# Patient Record
Sex: FEMALE | Race: WHITE | NOT HISPANIC OR LATINO | Employment: OTHER | URBAN - METROPOLITAN AREA
[De-identification: names, ages, dates, MRNs, and addresses within clinical notes are randomized per-mention and may not be internally consistent; named-entity substitution may affect disease eponyms.]

---

## 2017-05-09 PROCEDURE — 87088 URINE BACTERIA CULTURE: CPT | Performed by: INTERNAL MEDICINE

## 2017-05-09 PROCEDURE — 87186 SC STD MICRODIL/AGAR DIL: CPT | Performed by: INTERNAL MEDICINE

## 2017-05-09 PROCEDURE — 87086 URINE CULTURE/COLONY COUNT: CPT | Performed by: INTERNAL MEDICINE

## 2017-07-31 PROBLEM — E78.2 MIXED HYPERLIPIDEMIA: Status: ACTIVE | Noted: 2017-07-31

## 2018-01-03 PROCEDURE — 81001 URINALYSIS AUTO W/SCOPE: CPT | Performed by: INTERNAL MEDICINE

## 2018-01-05 PROBLEM — M85.80 OSTEOPENIA, UNSPECIFIED LOCATION: Status: ACTIVE | Noted: 2018-01-05

## 2018-01-05 PROCEDURE — 87086 URINE CULTURE/COLONY COUNT: CPT | Performed by: INTERNAL MEDICINE

## 2018-01-05 PROCEDURE — 87147 CULTURE TYPE IMMUNOLOGIC: CPT | Performed by: INTERNAL MEDICINE

## 2019-01-09 PROCEDURE — 81001 URINALYSIS AUTO W/SCOPE: CPT | Performed by: INTERNAL MEDICINE

## 2019-01-11 PROCEDURE — 87186 SC STD MICRODIL/AGAR DIL: CPT | Performed by: INTERNAL MEDICINE

## 2019-01-11 PROCEDURE — 87088 URINE BACTERIA CULTURE: CPT | Performed by: INTERNAL MEDICINE

## 2019-01-11 PROCEDURE — 87086 URINE CULTURE/COLONY COUNT: CPT | Performed by: INTERNAL MEDICINE

## 2019-01-29 PROCEDURE — 87086 URINE CULTURE/COLONY COUNT: CPT | Performed by: INTERNAL MEDICINE

## 2021-01-12 PROBLEM — M16.11 ARTHRITIS OF RIGHT HIP: Status: ACTIVE | Noted: 2021-01-12

## 2021-02-24 ENCOUNTER — WALK IN (OUTPATIENT)
Dept: URGENT CARE | Age: 81
End: 2021-02-24
Attending: EMERGENCY MEDICINE

## 2021-02-24 ENCOUNTER — HOSPITAL ENCOUNTER (OUTPATIENT)
Dept: CT IMAGING | Age: 81
Discharge: HOME | End: 2021-02-24
Attending: EMERGENCY MEDICINE

## 2021-02-24 VITALS
DIASTOLIC BLOOD PRESSURE: 100 MMHG | SYSTOLIC BLOOD PRESSURE: 152 MMHG | HEART RATE: 78 BPM | OXYGEN SATURATION: 98 % | RESPIRATION RATE: 16 BRPM | TEMPERATURE: 97.8 F

## 2021-02-24 DIAGNOSIS — S01.81XA LACERATION OF FOREHEAD, INITIAL ENCOUNTER: ICD-10-CM

## 2021-02-24 DIAGNOSIS — S09.8XXA BLUNT HEAD TRAUMA, INITIAL ENCOUNTER: ICD-10-CM

## 2021-02-24 DIAGNOSIS — S09.8XXA BLUNT HEAD TRAUMA, INITIAL ENCOUNTER: Primary | ICD-10-CM

## 2021-02-24 PROCEDURE — 12013 RPR F/E/E/N/L/M 2.6-5.0 CM: CPT

## 2021-02-24 PROCEDURE — 99202 OFFICE O/P NEW SF 15 MIN: CPT

## 2021-02-24 PROCEDURE — 70450 CT HEAD/BRAIN W/O DYE: CPT

## 2021-02-24 ASSESSMENT — ENCOUNTER SYMPTOMS
COUGH: 0
EYE PAIN: 0
ABDOMINAL PAIN: 0
BACK PAIN: 0
FEVER: 0
DIZZINESS: 0

## 2021-03-03 ENCOUNTER — WALK IN (OUTPATIENT)
Dept: URGENT CARE | Age: 81
End: 2021-03-03
Attending: EMERGENCY MEDICINE

## 2021-03-03 VITALS
HEART RATE: 74 BPM | RESPIRATION RATE: 18 BRPM | SYSTOLIC BLOOD PRESSURE: 133 MMHG | TEMPERATURE: 96.4 F | OXYGEN SATURATION: 98 % | DIASTOLIC BLOOD PRESSURE: 90 MMHG | WEIGHT: 127 LBS

## 2021-03-03 DIAGNOSIS — Z48.02 VISIT FOR SUTURE REMOVAL: Primary | ICD-10-CM

## 2021-03-03 PROCEDURE — 99211 OFF/OP EST MAY X REQ PHY/QHP: CPT

## 2021-03-03 ASSESSMENT — ENCOUNTER SYMPTOMS
NAUSEA: 0
EYE REDNESS: 0
ACTIVITY CHANGE: 0
FACIAL SWELLING: 0
DIZZINESS: 0
DIARRHEA: 0
POLYPHAGIA: 0
ABDOMINAL PAIN: 0
BACK PAIN: 0
EYE PAIN: 0
VOMITING: 0
BRUISES/BLEEDS EASILY: 0
CHILLS: 0
SORE THROAT: 0
FEVER: 0
SHORTNESS OF BREATH: 0
WHEEZING: 0
HEADACHES: 0
WOUND: 0
CONFUSION: 0
POLYDIPSIA: 0
EYE DISCHARGE: 0
COLOR CHANGE: 0
COUGH: 0
NUMBNESS: 0

## 2021-04-20 NOTE — H&P (VIEW-ONLY)
[facility]: Adult Reconstruction Clinic Note    CC: Right HIP PAIN     HPI: Ravi Morrell female presents for presurgical evaluation.  She is accompanied by her daughter. Adama Patricia above 15.      States that her pain has been in mouth 2 (two) times daily. , Disp: 180 capsule, Rfl: 3  DAILY MULTIVITAMIN OR, 1 TAB A DAY, Disp: , Rfl:     No current facility-administered medications on file prior to visit.     Past Surgical History:   Procedure Laterality Date   • Appendectomy     • Co Determinants of Health  Financial Resource Strain:       Difficulty of Paying Living Expenses:   Food Insecurity:       Worried About [address] in the Last Year:       Ran Out of Food in the Last Year:   Transportation Needs:       Lack of Transp Keven, negative tender to palpation over greater trochanter     IMAGING: None new     IMPRESSION:   79year old female presents with right hip pain secondary to severe degenerative joint disease.     PLAN:   Reiterated treatment options.      She rem

## 2021-04-23 RX ORDER — MULTIVIT-MIN/IRON/FOLIC ACID/K 18-600-40
1 CAPSULE ORAL DAILY
COMMUNITY

## 2021-04-25 ENCOUNTER — LAB ENCOUNTER (OUTPATIENT)
Dept: LAB | Facility: HOSPITAL | Age: 81
End: 2021-04-25
Attending: ORTHOPAEDIC SURGERY
Payer: MEDICARE

## 2021-04-25 DIAGNOSIS — Z01.818 PREOPERATIVE TESTING: ICD-10-CM

## 2021-04-25 PROCEDURE — 86850 RBC ANTIBODY SCREEN: CPT

## 2021-04-25 PROCEDURE — 36415 COLL VENOUS BLD VENIPUNCTURE: CPT

## 2021-04-25 PROCEDURE — 86901 BLOOD TYPING SEROLOGIC RH(D): CPT

## 2021-04-25 PROCEDURE — 86900 BLOOD TYPING SEROLOGIC ABO: CPT

## 2021-04-27 RX ORDER — TRANEXAMIC ACID 10 MG/ML
1000 INJECTION, SOLUTION INTRAVENOUS ONCE
Status: COMPLETED | OUTPATIENT
Start: 2021-04-28 | End: 2021-04-28

## 2021-04-28 ENCOUNTER — HOSPITAL ENCOUNTER (INPATIENT)
Facility: HOSPITAL | Age: 81
LOS: 1 days | Discharge: HOME HEALTH | DRG: 470 | End: 2021-05-01
Attending: ORTHOPAEDIC SURGERY | Admitting: ORTHOPAEDIC SURGERY
Payer: MEDICARE

## 2021-04-28 ENCOUNTER — ANESTHESIA EVENT (OUTPATIENT)
Dept: SURGERY | Facility: HOSPITAL | Age: 81
DRG: 470 | End: 2021-04-28
Payer: MEDICARE

## 2021-04-28 ENCOUNTER — APPOINTMENT (OUTPATIENT)
Dept: GENERAL RADIOLOGY | Facility: HOSPITAL | Age: 81
DRG: 470 | End: 2021-04-28
Attending: PHYSICIAN ASSISTANT
Payer: MEDICARE

## 2021-04-28 ENCOUNTER — ANESTHESIA (OUTPATIENT)
Dept: SURGERY | Facility: HOSPITAL | Age: 81
DRG: 470 | End: 2021-04-28
Payer: MEDICARE

## 2021-04-28 ENCOUNTER — APPOINTMENT (OUTPATIENT)
Dept: GENERAL RADIOLOGY | Facility: HOSPITAL | Age: 81
DRG: 470 | End: 2021-04-28
Attending: ORTHOPAEDIC SURGERY
Payer: MEDICARE

## 2021-04-28 DIAGNOSIS — Z01.818 PREOPERATIVE TESTING: Primary | ICD-10-CM

## 2021-04-28 DIAGNOSIS — M16.11 PRIMARY OSTEOARTHRITIS OF RIGHT HIP: ICD-10-CM

## 2021-04-28 DIAGNOSIS — K58.9 IRRITABLE BOWEL SYNDROME WITHOUT DIARRHEA: ICD-10-CM

## 2021-04-28 PROCEDURE — 76000 FLUOROSCOPY <1 HR PHYS/QHP: CPT | Performed by: ORTHOPAEDIC SURGERY

## 2021-04-28 PROCEDURE — 0SR904A REPLACEMENT OF RIGHT HIP JOINT WITH CERAMIC ON POLYETHYLENE SYNTHETIC SUBSTITUTE, UNCEMENTED, OPEN APPROACH: ICD-10-PCS | Performed by: ORTHOPAEDIC SURGERY

## 2021-04-28 PROCEDURE — 86701 HIV-1ANTIBODY: CPT | Performed by: ANESTHESIOLOGY

## 2021-04-28 PROCEDURE — 80048 BASIC METABOLIC PNL TOTAL CA: CPT | Performed by: NURSE PRACTITIONER

## 2021-04-28 PROCEDURE — 86803 HEPATITIS C AB TEST: CPT | Performed by: ANESTHESIOLOGY

## 2021-04-28 PROCEDURE — 73501 X-RAY EXAM HIP UNI 1 VIEW: CPT | Performed by: PHYSICIAN ASSISTANT

## 2021-04-28 PROCEDURE — 87340 HEPATITIS B SURFACE AG IA: CPT | Performed by: ANESTHESIOLOGY

## 2021-04-28 PROCEDURE — 88311 DECALCIFY TISSUE: CPT | Performed by: ORTHOPAEDIC SURGERY

## 2021-04-28 PROCEDURE — 88305 TISSUE EXAM BY PATHOLOGIST: CPT | Performed by: ORTHOPAEDIC SURGERY

## 2021-04-28 DEVICE — TOTAL HIP W/GRP CUP & COCR HD: Type: IMPLANTABLE DEVICE | Status: FUNCTIONAL

## 2021-04-28 DEVICE — SUMMIT FEMORAL STEM 12/14 TAPER TAPER ED W/POROCOAT SIZE 4 STD 140MM
Type: IMPLANTABLE DEVICE | Site: HIP | Status: FUNCTIONAL
Brand: SUMMIT POROCOAT

## 2021-04-28 DEVICE — BIOLOX DELTA CERAMIC FEMORAL HEAD +1.5 36MM DIA 12/14 TAPER
Type: IMPLANTABLE DEVICE | Site: HIP | Status: FUNCTIONAL
Brand: BIOLOX DELTA

## 2021-04-28 DEVICE — PINNACLE HIP SOLUTIONS ALTRX POLYETHYLENE ACETABULAR LINER NEUTRAL 36MM ID 54MM OD
Type: IMPLANTABLE DEVICE | Site: HIP | Status: FUNCTIONAL
Brand: PINNACLE ALTRX

## 2021-04-28 DEVICE — PINNACLE GRIPTION ACETABULAR SHELL MULTI-HOLE 54MM OD
Type: IMPLANTABLE DEVICE | Site: HIP | Status: FUNCTIONAL
Brand: PINNACLE GRIPTION

## 2021-04-28 DEVICE — CERAMIC HEAD UPCHARGE: Type: IMPLANTABLE DEVICE | Status: FUNCTIONAL

## 2021-04-28 RX ORDER — HYDROMORPHONE HYDROCHLORIDE 1 MG/ML
0.4 INJECTION, SOLUTION INTRAMUSCULAR; INTRAVENOUS; SUBCUTANEOUS EVERY 5 MIN PRN
Status: DISCONTINUED | OUTPATIENT
Start: 2021-04-28 | End: 2021-04-28 | Stop reason: HOSPADM

## 2021-04-28 RX ORDER — MORPHINE SULFATE 4 MG/ML
2 INJECTION, SOLUTION INTRAMUSCULAR; INTRAVENOUS EVERY 10 MIN PRN
Status: DISCONTINUED | OUTPATIENT
Start: 2021-04-28 | End: 2021-04-28 | Stop reason: HOSPADM

## 2021-04-28 RX ORDER — HYDROMORPHONE HYDROCHLORIDE 1 MG/ML
0.2 INJECTION, SOLUTION INTRAMUSCULAR; INTRAVENOUS; SUBCUTANEOUS EVERY 5 MIN PRN
Status: DISCONTINUED | OUTPATIENT
Start: 2021-04-28 | End: 2021-04-28 | Stop reason: HOSPADM

## 2021-04-28 RX ORDER — ACETAMINOPHEN 500 MG
1000 TABLET ORAL ONCE
Status: DISCONTINUED | OUTPATIENT
Start: 2021-04-28 | End: 2021-04-28

## 2021-04-28 RX ORDER — SENNOSIDES 8.6 MG
17.2 TABLET ORAL NIGHTLY
Status: DISCONTINUED | OUTPATIENT
Start: 2021-04-28 | End: 2021-05-01

## 2021-04-28 RX ORDER — SODIUM PHOSPHATE, DIBASIC AND SODIUM PHOSPHATE, MONOBASIC 7; 19 G/133ML; G/133ML
1 ENEMA RECTAL ONCE AS NEEDED
Status: DISCONTINUED | OUTPATIENT
Start: 2021-04-28 | End: 2021-05-01

## 2021-04-28 RX ORDER — SODIUM CHLORIDE, SODIUM LACTATE, POTASSIUM CHLORIDE, CALCIUM CHLORIDE 600; 310; 30; 20 MG/100ML; MG/100ML; MG/100ML; MG/100ML
INJECTION, SOLUTION INTRAVENOUS CONTINUOUS
Status: DISCONTINUED | OUTPATIENT
Start: 2021-04-28 | End: 2021-04-28 | Stop reason: HOSPADM

## 2021-04-28 RX ORDER — LIDOCAINE HYDROCHLORIDE 10 MG/ML
INJECTION, SOLUTION INFILTRATION; PERINEURAL
Status: COMPLETED | OUTPATIENT
Start: 2021-04-28 | End: 2021-04-28

## 2021-04-28 RX ORDER — DIPHENHYDRAMINE HYDROCHLORIDE 50 MG/ML
12.5 INJECTION INTRAMUSCULAR; INTRAVENOUS EVERY 4 HOURS PRN
Status: DISCONTINUED | OUTPATIENT
Start: 2021-04-28 | End: 2021-05-01

## 2021-04-28 RX ORDER — PANTOPRAZOLE SODIUM 20 MG/1
20 TABLET, DELAYED RELEASE ORAL
Status: DISCONTINUED | OUTPATIENT
Start: 2021-04-29 | End: 2021-05-01

## 2021-04-28 RX ORDER — DIPHENHYDRAMINE HYDROCHLORIDE 50 MG/ML
25 INJECTION INTRAMUSCULAR; INTRAVENOUS ONCE AS NEEDED
Status: ACTIVE | OUTPATIENT
Start: 2021-04-28 | End: 2021-04-28

## 2021-04-28 RX ORDER — CELECOXIB 200 MG/1
200 CAPSULE ORAL ONCE
Status: COMPLETED | OUTPATIENT
Start: 2021-04-28 | End: 2021-04-28

## 2021-04-28 RX ORDER — MORPHINE SULFATE 10 MG/ML
6 INJECTION, SOLUTION INTRAMUSCULAR; INTRAVENOUS EVERY 10 MIN PRN
Status: DISCONTINUED | OUTPATIENT
Start: 2021-04-28 | End: 2021-04-28 | Stop reason: HOSPADM

## 2021-04-28 RX ORDER — MORPHINE SULFATE 4 MG/ML
4 INJECTION, SOLUTION INTRAMUSCULAR; INTRAVENOUS EVERY 2 HOUR PRN
Status: DISCONTINUED | OUTPATIENT
Start: 2021-04-28 | End: 2021-05-01

## 2021-04-28 RX ORDER — ASPIRIN 81 MG/1
81 TABLET ORAL 2 TIMES DAILY
Qty: 60 TABLET | Refills: 0 | Status: SHIPPED | OUTPATIENT
Start: 2021-04-28 | End: 2022-01-24

## 2021-04-28 RX ORDER — BISACODYL 10 MG
10 SUPPOSITORY, RECTAL RECTAL
Status: DISCONTINUED | OUTPATIENT
Start: 2021-04-28 | End: 2021-05-01

## 2021-04-28 RX ORDER — PREGABALIN 75 MG/1
75 CAPSULE ORAL EVERY MORNING
Qty: 30 CAPSULE | Refills: 0 | Status: SHIPPED | OUTPATIENT
Start: 2021-04-28 | End: 2022-01-24

## 2021-04-28 RX ORDER — SODIUM CHLORIDE, SODIUM LACTATE, POTASSIUM CHLORIDE, CALCIUM CHLORIDE 600; 310; 30; 20 MG/100ML; MG/100ML; MG/100ML; MG/100ML
INJECTION, SOLUTION INTRAVENOUS CONTINUOUS
Status: DISCONTINUED | OUTPATIENT
Start: 2021-04-28 | End: 2021-04-30

## 2021-04-28 RX ORDER — OXYCODONE HYDROCHLORIDE 5 MG/1
CAPSULE ORAL
Qty: 20 CAPSULE | Refills: 0 | Status: SHIPPED | OUTPATIENT
Start: 2021-04-28 | End: 2021-08-17 | Stop reason: ALTCHOICE

## 2021-04-28 RX ORDER — ACETAMINOPHEN 500 MG
1000 TABLET ORAL ONCE
Status: COMPLETED | OUTPATIENT
Start: 2021-04-28 | End: 2021-04-28

## 2021-04-28 RX ORDER — ACETAMINOPHEN 500 MG
1000 TABLET ORAL EVERY 6 HOURS PRN
Qty: 90 TABLET | Refills: 1 | Status: SHIPPED | OUTPATIENT
Start: 2021-04-28

## 2021-04-28 RX ORDER — CHLORDIAZEPOXIDE HYDROCHLORIDE 5 MG/1
5 CAPSULE, GELATIN COATED ORAL NIGHTLY
Status: DISCONTINUED | OUTPATIENT
Start: 2021-04-28 | End: 2021-05-01

## 2021-04-28 RX ORDER — OXYCODONE HYDROCHLORIDE 5 MG/1
5 TABLET ORAL EVERY 4 HOURS PRN
Status: DISCONTINUED | OUTPATIENT
Start: 2021-04-28 | End: 2021-05-01

## 2021-04-28 RX ORDER — OXYCODONE HCL 10 MG/1
10 TABLET, FILM COATED, EXTENDED RELEASE ORAL ONCE
Status: COMPLETED | OUTPATIENT
Start: 2021-04-28 | End: 2021-04-28

## 2021-04-28 RX ORDER — ONDANSETRON 2 MG/ML
4 INJECTION INTRAMUSCULAR; INTRAVENOUS EVERY 4 HOURS PRN
Status: DISPENSED | OUTPATIENT
Start: 2021-04-28 | End: 2021-04-30

## 2021-04-28 RX ORDER — ONDANSETRON 2 MG/ML
4 INJECTION INTRAMUSCULAR; INTRAVENOUS ONCE AS NEEDED
Status: COMPLETED | OUTPATIENT
Start: 2021-04-28 | End: 2021-04-28

## 2021-04-28 RX ORDER — DIPHENHYDRAMINE HCL 25 MG
25 CAPSULE ORAL EVERY 4 HOURS PRN
Status: DISCONTINUED | OUTPATIENT
Start: 2021-04-28 | End: 2021-05-01

## 2021-04-28 RX ORDER — ACETAMINOPHEN 500 MG
1000 TABLET ORAL EVERY 6 HOURS PRN
Status: DISCONTINUED | OUTPATIENT
Start: 2021-04-28 | End: 2021-05-01

## 2021-04-28 RX ORDER — MORPHINE SULFATE 4 MG/ML
4 INJECTION, SOLUTION INTRAMUSCULAR; INTRAVENOUS EVERY 10 MIN PRN
Status: DISCONTINUED | OUTPATIENT
Start: 2021-04-28 | End: 2021-04-28 | Stop reason: HOSPADM

## 2021-04-28 RX ORDER — CEFAZOLIN SODIUM/WATER 2 G/20 ML
2 SYRINGE (ML) INTRAVENOUS ONCE
Status: COMPLETED | OUTPATIENT
Start: 2021-04-28 | End: 2021-04-28

## 2021-04-28 RX ORDER — PROCHLORPERAZINE EDISYLATE 5 MG/ML
5 INJECTION INTRAMUSCULAR; INTRAVENOUS ONCE AS NEEDED
Status: DISCONTINUED | OUTPATIENT
Start: 2021-04-28 | End: 2021-04-28 | Stop reason: HOSPADM

## 2021-04-28 RX ORDER — HYDROCODONE BITARTRATE AND ACETAMINOPHEN 5; 325 MG/1; MG/1
1 TABLET ORAL AS NEEDED
Status: DISCONTINUED | OUTPATIENT
Start: 2021-04-28 | End: 2021-04-28 | Stop reason: HOSPADM

## 2021-04-28 RX ORDER — MORPHINE SULFATE 2 MG/ML
2 INJECTION, SOLUTION INTRAMUSCULAR; INTRAVENOUS EVERY 2 HOUR PRN
Status: DISCONTINUED | OUTPATIENT
Start: 2021-04-28 | End: 2021-05-01

## 2021-04-28 RX ORDER — NALOXONE HYDROCHLORIDE 0.4 MG/ML
80 INJECTION, SOLUTION INTRAMUSCULAR; INTRAVENOUS; SUBCUTANEOUS AS NEEDED
Status: DISCONTINUED | OUTPATIENT
Start: 2021-04-28 | End: 2021-04-28 | Stop reason: HOSPADM

## 2021-04-28 RX ORDER — HYDROMORPHONE HYDROCHLORIDE 1 MG/ML
0.6 INJECTION, SOLUTION INTRAMUSCULAR; INTRAVENOUS; SUBCUTANEOUS EVERY 5 MIN PRN
Status: DISCONTINUED | OUTPATIENT
Start: 2021-04-28 | End: 2021-04-28 | Stop reason: HOSPADM

## 2021-04-28 RX ORDER — POLYETHYLENE GLYCOL 3350 17 G/17G
17 POWDER, FOR SOLUTION ORAL DAILY PRN
Status: DISCONTINUED | OUTPATIENT
Start: 2021-04-28 | End: 2021-05-01

## 2021-04-28 RX ORDER — HYDROCODONE BITARTRATE AND ACETAMINOPHEN 5; 325 MG/1; MG/1
2 TABLET ORAL AS NEEDED
Status: DISCONTINUED | OUTPATIENT
Start: 2021-04-28 | End: 2021-04-28 | Stop reason: HOSPADM

## 2021-04-28 RX ORDER — LIDOCAINE HYDROCHLORIDE 10 MG/ML
INJECTION, SOLUTION EPIDURAL; INFILTRATION; INTRACAUDAL; PERINEURAL AS NEEDED
Status: DISCONTINUED | OUTPATIENT
Start: 2021-04-28 | End: 2021-04-28 | Stop reason: SURG

## 2021-04-28 RX ORDER — ASPIRIN 81 MG/1
81 TABLET ORAL 2 TIMES DAILY
Status: DISCONTINUED | OUTPATIENT
Start: 2021-04-28 | End: 2021-05-01

## 2021-04-28 RX ORDER — CELECOXIB 200 MG/1
200 CAPSULE ORAL EVERY MORNING
Qty: 30 CAPSULE | Refills: 0 | Status: SHIPPED | OUTPATIENT
Start: 2021-04-28 | End: 2022-01-24

## 2021-04-28 RX ORDER — PROCHLORPERAZINE EDISYLATE 5 MG/ML
10 INJECTION INTRAMUSCULAR; INTRAVENOUS EVERY 6 HOURS PRN
Status: ACTIVE | OUTPATIENT
Start: 2021-04-28 | End: 2021-04-30

## 2021-04-28 RX ORDER — DOCUSATE SODIUM 100 MG/1
100 CAPSULE, LIQUID FILLED ORAL 2 TIMES DAILY
Status: DISCONTINUED | OUTPATIENT
Start: 2021-04-28 | End: 2021-05-01

## 2021-04-28 RX ORDER — CEFAZOLIN SODIUM/WATER 2 G/20 ML
2 SYRINGE (ML) INTRAVENOUS EVERY 8 HOURS
Status: COMPLETED | OUTPATIENT
Start: 2021-04-28 | End: 2021-04-29

## 2021-04-28 RX ORDER — CELECOXIB 200 MG/1
200 CAPSULE ORAL DAILY
Status: DISCONTINUED | OUTPATIENT
Start: 2021-04-28 | End: 2021-05-01

## 2021-04-28 RX ORDER — BUPIVACAINE HYDROCHLORIDE 7.5 MG/ML
INJECTION, SOLUTION INTRASPINAL
Status: COMPLETED | OUTPATIENT
Start: 2021-04-28 | End: 2021-04-28

## 2021-04-28 RX ORDER — TRANEXAMIC ACID 10 MG/ML
1000 INJECTION, SOLUTION INTRAVENOUS ONCE
Status: COMPLETED | OUTPATIENT
Start: 2021-04-28 | End: 2021-04-28

## 2021-04-28 RX ORDER — MORPHINE SULFATE 2 MG/ML
1 INJECTION, SOLUTION INTRAMUSCULAR; INTRAVENOUS EVERY 2 HOUR PRN
Status: DISCONTINUED | OUTPATIENT
Start: 2021-04-28 | End: 2021-05-01

## 2021-04-28 RX ORDER — EPHEDRINE SULFATE 50 MG/ML
INJECTION INTRAVENOUS AS NEEDED
Status: DISCONTINUED | OUTPATIENT
Start: 2021-04-28 | End: 2021-04-28 | Stop reason: SURG

## 2021-04-28 RX ADMIN — LIDOCAINE HYDROCHLORIDE 5 ML: 10 INJECTION, SOLUTION INFILTRATION; PERINEURAL at 12:11:00

## 2021-04-28 RX ADMIN — SODIUM CHLORIDE, SODIUM LACTATE, POTASSIUM CHLORIDE, CALCIUM CHLORIDE: 600; 310; 30; 20 INJECTION, SOLUTION INTRAVENOUS at 12:03:00

## 2021-04-28 RX ADMIN — SODIUM CHLORIDE, SODIUM LACTATE, POTASSIUM CHLORIDE, CALCIUM CHLORIDE: 600; 310; 30; 20 INJECTION, SOLUTION INTRAVENOUS at 14:07:00

## 2021-04-28 RX ADMIN — TRANEXAMIC ACID 1000 MG: 10 INJECTION, SOLUTION INTRAVENOUS at 13:37:00

## 2021-04-28 RX ADMIN — TRANEXAMIC ACID 1000 MG: 10 INJECTION, SOLUTION INTRAVENOUS at 12:23:00

## 2021-04-28 RX ADMIN — EPHEDRINE SULFATE 10 MG: 50 INJECTION INTRAVENOUS at 12:25:00

## 2021-04-28 RX ADMIN — BUPIVACAINE HYDROCHLORIDE 1.5 ML: 7.5 INJECTION, SOLUTION INTRASPINAL at 12:11:00

## 2021-04-28 RX ADMIN — EPHEDRINE SULFATE 5 MG: 50 INJECTION INTRAVENOUS at 13:25:00

## 2021-04-28 RX ADMIN — LIDOCAINE HYDROCHLORIDE 30 MG: 10 INJECTION, SOLUTION EPIDURAL; INFILTRATION; INTRACAUDAL; PERINEURAL at 12:13:00

## 2021-04-28 RX ADMIN — CEFAZOLIN SODIUM/WATER 2 G: 2 G/20 ML SYRINGE (ML) INTRAVENOUS at 12:20:00

## 2021-04-28 NOTE — OPERATIVE REPORT
Operative Report     PATIENT NAME: Arlene Sheth  : 3/7/1940  KARSTEN: [id number]     DATE OF SURGERY: 2021     ATTENDING: Edie Woods MD     PREOPERATIVE DIAGNOSIS: Right hip osteoarthritis      POSTOPERATIVE DIAGNOSIS: Same     PROCEDURE PERF Branches of the lateral femoral circumflex artery were identified and thoroughly coagulated. The lateral border of the rectus femoris muscle was identified, elevated off of the capsule.  Cobra retractors were placed around the femoral neck, outside of the c followed by the rasp and chili pepper broach was used. A size 3 broach was slightly undersized.  We deemed a size 4 broach determined to be the appropriate size.     We calcar planed to the appropriate level.     There were no fractures.     The hip was sta

## 2021-04-28 NOTE — ANESTHESIA POSTPROCEDURE EVALUATION
Patient: Carlie Guardado    Procedure Summary     Date: 04/28/21 Room / Location: [address] [place] [address] MAIN OR 08 / [address] [place] [address] MAIN OR    Anesthesia Start: 5982 Anesthesia Stop: 7153    Procedure: RIGHT TOTAL HIP ARTHROPLASTY (Right Hip) Diagnosis:       Primary osteoarthritis of r

## 2021-04-28 NOTE — H&P
[facility] Hospitalist Team  History and Physical  Admit Date:  4/28/21    ASSESSMENT / PLAN:   78 yo female with GERD/IBS and OA who is S/P Right TUCKER, see below for details     S/P Right TUCKER  -IV/PO prn pain meds.   Monitor mental status and vitals closely  -expe supple  RESPIRATORY: normal expansion; non labored, CTA   CARDIOVASCULAR: regular,nl S1 S2  ABDOMEN:  Soft, BS+; non distended, non tender    EXTREMITIES: right hip dressing    PMH  Past Medical History:   Diagnosis Date   • Esophageal reflux    • History 0  celecoxib (CELEBREX) 200 MG Oral Cap, Take 1 capsule (200 mg total) by mouth every morning., Disp: 30 capsule, Rfl: 0  pregabalin (LYRICA) 75 MG Oral Cap, Take 1 capsule (75 mg total) by mouth every morning., Disp: 30 capsule, Rfl: 0  Cholecalciferol (V (CST): Ellen Jon MD on 4/28/2021 at 2:07 PM     Finalized by (CST): Ellen Jon MD on 4/28/2021 at 2:07 PM              SEE ATTENDING NOTE BELOW    Patient seen and examined independently. Discussed with APN and agree with note above.       H

## 2021-04-28 NOTE — ANESTHESIA PROCEDURE NOTES
Spinal Block    Date/Time: 4/28/2021 12:11 PM  Performed by: Kenia Davis CRNA  Authorized by: Maria Elena Lynn MD       General Information and Staff    Start Time:  4/28/2021 12:08 PM  End Time:  4/28/2021 12:11 PM  Anesthesiologist:  Latrice Jarvis

## 2021-04-28 NOTE — INTERVAL H&P NOTE
Pre-op Diagnosis: Primary osteoarthritis of right hip [M16.11]    The above referenced H&P was reviewed by Yancy Alcantara MD on 4/28/2021, the patient was examined and no significant changes have occurred in the patient's condition since the H&P was per

## 2021-04-28 NOTE — ANESTHESIA PROCEDURE NOTES
Peripheral IV  Date/Time: 4/28/2021 1:19 PM  Inserted by: Ama Ortiz CRNA    Placement  Needle size: 12 G  Laterality: left  Location: antecubital  Site prep: alcohol  Technique: anatomical landmarks  Attempts: 1

## 2021-04-28 NOTE — ANESTHESIA PREPROCEDURE EVALUATION
Anesthesia PreOp Note    HPI:     Kiara Green is a 79year old female who presents for preoperative consultation requested by: Allison Oviedo MD    Date of Surgery: 4/28/2021    Procedure(s):  RIGHT TOTAL HIP ARTHROPLASTY  Indication: Primary osteoar PROLAPSE,SACROSP LIG     • TOTAL KNEE REPLACEMENT Right 2010    @[facility]   • UPPER GI ENDOSCOPY,DIAGNOSIS  3/18/09    Performed by Rachael Regalado at [facility]   • UPPER GI ENDOSCOPY,DIAGNOSIS  3/25/2013    Procedure: HMBFLUWFCOVEYPEWJRHEKLHNY Disp: 60 tablet, Rfl: 0  acetaminophen 500 MG Oral Tab, Take 2 tablets (1,000 mg total) by mouth every 6 (six) hours as needed for Pain., Disp: 90 tablet, Rfl: 1  oxyCODONE HCl 5 MG Oral Cap, 1 tablet/cap PO q 4-6 hrs., Disp: 20 capsule, Rfl: 0  celecoxib     Active Member of Clubs or Organizations:       Attends Club or Organization Meetings:       Marital Status:   Intimate Partner Violence:       Fear of Current or Ex-Partner:       Emotionally Abused:       Physically Abused:       Sexually Abused: risks including possible dental damage if relevant, major complications, and any alternative forms of anesthetic management. All of the patient's questions were answered to the best of my ability. The patient desires the anesthetic management as planned.

## 2021-04-29 PROBLEM — M16.11 ARTHRITIS OF RIGHT HIP: Status: RESOLVED | Noted: 2021-01-12 | Resolved: 2021-04-29

## 2021-04-29 PROCEDURE — 97162 PT EVAL MOD COMPLEX 30 MIN: CPT

## 2021-04-29 PROCEDURE — 97110 THERAPEUTIC EXERCISES: CPT

## 2021-04-29 PROCEDURE — 85018 HEMOGLOBIN: CPT | Performed by: PHYSICIAN ASSISTANT

## 2021-04-29 PROCEDURE — 85027 COMPLETE CBC AUTOMATED: CPT | Performed by: NURSE PRACTITIONER

## 2021-04-29 PROCEDURE — 97530 THERAPEUTIC ACTIVITIES: CPT

## 2021-04-29 PROCEDURE — 85014 HEMATOCRIT: CPT | Performed by: PHYSICIAN ASSISTANT

## 2021-04-29 PROCEDURE — 97116 GAIT TRAINING THERAPY: CPT

## 2021-04-29 RX ORDER — CHLORDIAZEPOXIDE HYDROCHLORIDE AND CLIDINIUM BROMIDE 5; 2.5 MG/1; MG/1
1 CAPSULE ORAL NIGHTLY
Status: SHIPPED | COMMUNITY
Start: 2021-04-29

## 2021-04-29 RX ORDER — TAMSULOSIN HYDROCHLORIDE 0.4 MG/1
0.4 CAPSULE ORAL ONCE
Status: COMPLETED | OUTPATIENT
Start: 2021-04-29 | End: 2021-04-29

## 2021-04-29 NOTE — CM/SW NOTE
SW received MDO for surgery.      SW received message from [facility] who states pt was pre operatively arranged with [facility] will follow up on DC to discuss [address]: [facility] pending med ALINE Rod,

## 2021-04-29 NOTE — PLAN OF CARE
Alert and oriented x 3, Ra, SCD bilaterally & ASA, check void by 1500, Oxy IR for pain, mepilex to the site, up 1 x walker.    Problem: DISCHARGE PLANNING  Goal: Discharge to home or other facility with appropriate resources  Description: INTERVENTIONS:  - threatening arrhythmias  - Monitor electrolytes and administer replacement therapy as ordered  Outcome: Progressing     Problem: GASTROINTESTINAL - ADULT  Goal: Minimal or absence of nausea and vomiting  Description: INTERVENTIONS:  - Maintain adequate hyd and decision-making at the level they choose  - Honor patient and family perspectives and choices  Outcome: Progressing     Problem: Patient/Family Goals  Goal: Patient/Family Long Term Goal  Description: Patient's Long Term Goal: I want to go home     Int

## 2021-04-29 NOTE — CHRONIC PAIN
[facility] - [facility]  Report of Consultation    Solomon Lagunas Patient Status:  Outpatient in a Bed    3/7/1940 MRN [id number]   Location [facility] 4W/SW/SE Attending Denia Escalante MD   Hosp Day # 0 PCP Manjula Pride MD     Date of A ENDOSCOPY,DIAGNOSIS  3/25/2013    Procedure: ESOPHAGOGASTRODUODENOSCOPY, POSSIBLE BIOPSY, POSSIBLE POLYPECTOMY 19313;  Surgeon:  Marianne Oleary MD;  Location: [address] [place] [address]     Family History   Problem Relation Age of Onset   • Other ( at mg, Oral, Q6H PRN  •  oxyCODONE HCl (OXY-IR) cap/tab 5 mg, 5 mg, Oral, Q4H PRN  •  chlordiazePOXIDE HCl (LIBRIUM) cap 5 mg, 5 mg, Oral, Nightly  •  Pantoprazole Sodium (PROTONIX) EC tab 20 mg, 20 mg, Oral, QAM AC    Review of Systems:  Pertinent items are steroids, analgesics), injections, and further testing. Risks and benefits of all options were discussed at length to patients satisfaction during a comprehensive interactive discussion.  All questions were answered during extended questions and answer sess

## 2021-04-29 NOTE — PHYSICAL THERAPY NOTE
PHYSICAL THERAPY EVALUATION - INPATIENT     Room Number: 410/410-A  Evaluation Date: 4/29/2021  Type of Evaluation: Initial   Physician Order: PT Eval and Treat    Presenting Problem: R ant TUCKER  Reason for Therapy: Mobility Dysfunction and Discharge Plann transfusion     possibly with knee surgery 2010   • IBS (irritable bowel syndrome)    • Osteoarthritis        Past Surgical History  Past Surgical History:   Procedure Laterality Date   • APPENDECTOMY     • COLONOSCOPY N/A 1/17/2017    Procedure: Sigrid Brood ROM and strength are within functional limits     Lower extremity ROM is within functional limits     Lower extremity strength is within functional limits     BALANCE  Static Sitting: Good  Dynamic Sitting: Good  Static Standing: Fair -  Dynamic Standing: Status Pt is CGA for 180ft   Goal #4 Patient will negotiate 6 stairs/one curb w/ assistive device and supervision   Goal #4   Current Status Not tested due to feeling sleeping and recently vomitting   Goal #5 Patient to demonstrate independence with home a

## 2021-04-29 NOTE — PROGRESS NOTES
[facility] Hospitalist Team  Progress Note    Adela Lopez Patient Status:  Outpatient in a Bed    3/7/1940 MRN [id number]   Location [facility] 4W/SW/SE Attending Julia Mcdonald MD   Hosp Day # 0 PCP Eri Vann MD     CC: Follow Up  PCP: Shawn Smith 10.7*   MCV 88.5   .0       Recent Labs   Lab 04/28/21  1453      K 4.1      CO2 27.0   BUN 18   CREATSERUM 0.73   CA 7.9*   *       No results for input(s): ALT, AST, ALB, AMYLASE, LIPASE, LDH in the last 168 hours.     Invalid in mg, 20 mg, Oral, QAM AC          IMAGING     XR FLUOROSCOPY C-ARM TIME <1 HOUR  (CPT=76000)    Result Date: 4/28/2021  CONCLUSION: Intraoperative fluoroscopy provided.     Dictated by (CST): Juliette Coulter MD on 4/28/2021 at 2:07 PM     Finalized by (CST Prophy- ASA  -PT/OT/SW-HHC  -as per surgery      Acute urinary retention  -Status post straight cath x1  -Flomax added  -Bladder scan if no urination after 8 hours.     Rest as above with above

## 2021-04-29 NOTE — PLAN OF CARE
Postop day 0 to 1. Patient up to bathroom with 1 assist and rolling walker, weight bearing as tolerated. She is voiding but PVRs show she is retaining urine, will continue to monitor. Oxy-IR PRN for pain. PT eval pending.   Plan for home with home healt severity of pain and evaluate response  - Implement non-pharmacological measures as appropriate and evaluate response  - Consider cultural and social influences on pain and pain management  - Manage/alleviate anxiety  - Utilize distraction and/or relaxatio be responsible for managing their own health  - Refer to Case Management Department for coordinating discharge planning if the patient needs post-hospital services based on physician/LIP order or complex needs related to functional status, cognitive abilit Maintain adequate hydration with IV or PO as ordered and tolerated  - Evaluate effectiveness of GI medications  - Encourage mobilization and activity  - Obtain nutritional consult as needed  - Establish a toileting routine/schedule  - Consider collaboratin

## 2021-04-30 PROBLEM — M16.9 OA (OSTEOARTHRITIS) OF HIP: Status: ACTIVE | Noted: 2021-04-30

## 2021-04-30 PROBLEM — Z01.818 PREOPERATIVE TESTING: Status: ACTIVE | Noted: 2021-04-30

## 2021-04-30 PROBLEM — Z01.818 PREOPERATIVE TESTING: Status: RESOLVED | Noted: 2021-04-30 | Resolved: 2021-04-30

## 2021-04-30 PROCEDURE — 97116 GAIT TRAINING THERAPY: CPT

## 2021-04-30 PROCEDURE — 80048 BASIC METABOLIC PNL TOTAL CA: CPT | Performed by: NURSE PRACTITIONER

## 2021-04-30 PROCEDURE — 84295 ASSAY OF SERUM SODIUM: CPT | Performed by: NURSE PRACTITIONER

## 2021-04-30 PROCEDURE — 97110 THERAPEUTIC EXERCISES: CPT

## 2021-04-30 PROCEDURE — 85027 COMPLETE CBC AUTOMATED: CPT | Performed by: NURSE PRACTITIONER

## 2021-04-30 RX ORDER — TAMSULOSIN HYDROCHLORIDE 0.4 MG/1
0.4 CAPSULE ORAL DAILY
Status: DISCONTINUED | OUTPATIENT
Start: 2021-04-30 | End: 2021-05-01

## 2021-04-30 RX ORDER — SODIUM CHLORIDE 9 MG/ML
INJECTION, SOLUTION INTRAVENOUS CONTINUOUS
Status: ACTIVE | OUTPATIENT
Start: 2021-04-30 | End: 2021-05-01

## 2021-04-30 NOTE — PROGRESS NOTES
[facility] Consult F/u    Patient seen and examined this PM  No acute complaints. Pain controlled w current regimen. Patient participated in PT and has done ambulation multiple times in hallway.   Understands she is still in hospital for hypona

## 2021-04-30 NOTE — PLAN OF CARE
Patient a&o x4. On RA. Up x1 w/walker. CMS intact. Dressing CDI. Patient voiding. Call light w/in reach and bed alarm on.  Plan is to possibly discharge tomorrow pending sodium levels per MD.    Problem: Patient Centered Care  Goal: Patient preferences are Notify MD/LIP if interventions unsuccessful or patient reports new pain  - Anticipate increased pain with activity and pre-medicate as appropriate  Outcome: Progressing     Problem: RISK FOR INFECTION - ADULT  Goal: Absence of fever/infection during antici Problem: CARDIOVASCULAR - ADULT  Goal: Maintains optimal cardiac output and hemodynamic stability  Description: INTERVENTIONS:  - Monitor vital signs, rhythm, and trends  - Monitor for bleeding, hypotension and signs of decreased cardiac output  - Evalua profile  Outcome: Progressing     Problem: GENITOURINARY - ADULT  Goal: Absence of urinary retention  Description: INTERVENTIONS:  - Assess patient’s ability to void and empty bladder  - Monitor intake/output and perform bladder scan as needed  - Follow ur

## 2021-04-30 NOTE — PHYSICAL THERAPY NOTE
PHYSICAL THERAPY HIP TREATMENT NOTE - INPATIENT    Room Number: 908/500-W            Presenting Problem: R ant TUCKER    Problem List  Active Problems:    * No active hospital problems. *      PHYSICAL THERAPY ASSESSMENT     Pt seen daily.  PTA wore mask,glove another person does the patient currently need. ..   -   Moving to and from a bed to a chair (including a wheelchair)?: A Little   -   Need to walk in hospital room?: A Little   -   Climbing 3-5 steps with a railing?: A Little     AM-PAC Score:  Raw Score:

## 2021-04-30 NOTE — PLAN OF CARE
Postop day 1 to 2. Patient up to bathroom with standby assist and rolling walker, weight bearing as tolerated. Voided 275 mL at 2200. Reports having nausea and poor appetite. Given dose of Zofran at bedtime. Tylenol and Oxy-IR PRN for pain.   Plan for appropriate and evaluate response  - Consider cultural and social influences on pain and pain management  - Manage/alleviate anxiety  - Utilize distraction and/or relaxation techniques  - Monitor for opioid side effects  - Notify MD/LIP if interventions un for coordinating discharge planning if the patient needs post-hospital services based on physician/LIP order or complex needs related to functional status, cognitive ability or social support system  Outcome: Progressing     Problem: CARDIOVASCULAR - ADULT effectiveness of GI medications  - Encourage mobilization and activity  - Obtain nutritional consult as needed  - Establish a toileting routine/schedule  - Consider collaborating with pharmacy to review patient's medication profile  Outcome: Progressing

## 2021-04-30 NOTE — PROGRESS NOTES
[facility] Hospitalist Team  Progress Note    Solomon Lagunas Patient Status:  Outpatient in a Bed    3/7/1940 MRN [id number]   Location [facility] 4W/SW/SE Attending Denia Escalante MD   Hosp Day # 0 PCP Manjula Pride MD     CC: Follow Up  PCP: Raymundo Bowman Blood pressure 142/78, pulse 63, temperature 98.7 °F (37.1 °C), temperature source Oral, resp. rate 18, height 5' 3\" (1.6 m), weight 132 lb 8 oz (60.1 kg), SpO2 97 %.     GENERAL: no apparent distress  NEURO: A/A Ox3  RESP: non labored, CTA  CARDIO: Regu PRN   Or  morphINE sulfate (PF) 4 MG/ML injection 4 mg, 4 mg, Intravenous, Q2H PRN  acetaminophen (TYLENOL EXTRA STRENGTH) tab 1,000 mg, 1,000 mg, Oral, Q6H PRN  oxyCODONE HCl (OXY-IR) cap/tab 5 mg, 5 mg, Oral, Q4H PRN  chlordiazePOXIDE HCl (LIBRIUM) cap 5 well perfused  Psych: mood stable, cooperative  Neuro: no focal deficits          Assessment and Plan  80 yo female with GERD/IBS and OA who is S/P Right TUCKER.  Postoperative course with acute urinary retention now resolved and hyponatremia, see below for de

## 2021-05-01 VITALS
HEART RATE: 77 BPM | DIASTOLIC BLOOD PRESSURE: 70 MMHG | TEMPERATURE: 98 F | WEIGHT: 132.5 LBS | BODY MASS INDEX: 23.48 KG/M2 | HEIGHT: 63 IN | OXYGEN SATURATION: 98 % | RESPIRATION RATE: 20 BRPM | SYSTOLIC BLOOD PRESSURE: 103 MMHG

## 2021-05-01 PROCEDURE — 97530 THERAPEUTIC ACTIVITIES: CPT

## 2021-05-01 PROCEDURE — 80048 BASIC METABOLIC PNL TOTAL CA: CPT | Performed by: NURSE PRACTITIONER

## 2021-05-01 PROCEDURE — 97116 GAIT TRAINING THERAPY: CPT

## 2021-05-01 NOTE — PLAN OF CARE
Problem: Patient Centered Care  Goal: Patient preferences are identified and integrated in the patient's plan of care  Description: Interventions:  - What would you like us to know as we care for you?  \"I am going to go home with my daughter  - Provide t management  - Manage/alleviate anxiety  - Utilize distraction and/or relaxation techniques  - Monitor for opioid side effects  - Notify MD/LIP if interventions unsuccessful or patient reports new pain  - Anticipate increased pain with activity and pre-medi Consider post-discharge preferences of patient/family/discharge partner  - Complete POLST form as appropriate  - Assess patient's ability to be responsible for managing their own health  - Refer to Case Management Department for coordinating discharge plan or PO as ordered and tolerated  - Nasogastric tube to low intermittent suction as ordered  - Evaluate effectiveness of ordered antiemetic medications  - Provide nonpharmacologic comfort measures as appropriate  - Advance diet as tolerated, if ordered  - Ob weeks. Encouraged to drink non-water fluids as per AVS. Pt aware to follow up with Dr. Chris Devine as previously scheduled in 2 weeks. Safe to discharge home. Assist to car with PCT. IV removed.

## 2021-05-01 NOTE — PLAN OF CARE
Problem: Patient Centered Care  Goal: Patient preferences are identified and integrated in the patient's plan of care  Description: Interventions:  - What would you like us to know as we care for you?  \"I am going to go home with my daughter  - Provide t INFECTION - ADULT  Goal: Absence of fever/infection during anticipated neutropenic period  Description: INTERVENTIONS  - Monitor WBC  - Administer growth factors as ordered  - Implement neutropenic guidelines  Outcome: Progressing     Problem: SAFETY ADULT bleeding, hypotension and signs of decreased cardiac output  - Evaluate effectiveness of vasoactive medications to optimize hemodynamic stability  - Monitor arterial and/or venous puncture sites for bleeding and/or hematoma  - Assess quality of pulses, ski Monitor intake/output and perform bladder scan as needed  - Follow urinary retention protocol/standard of care  - Consider collaborating with pharmacy to review patient's medication profile  - Implement strategies to promote bladder emptying  Outcome: Prog

## 2021-05-01 NOTE — DISCHARGE SUMMARY
[facility] Internal Medicine Discharge Summary   Patient ID:  Carlie Guardado  [id number]  26 year old  3/7/1940    Admit date: 4/28/2021    Discharge date and time: 5/1/2021 11:37 AM      Attending Physician: No att. providers found     Primary Care Physician: Medina Fairbanks [phone], [phone]  [address] [facility] [address]    Phone: [phone]   · acetaminophen 500 MG Tabs  · aspirin 81 MG Tbec  · celecoxib 200 MG Caps  · oxyCODONE HCl 5 MG Caps  · pregabalin 75 MG Caps       HPI per chart  Patient se meds     Hyperkalemia-resolved   -pre op k 5.1, post op K 4.1     GERD  IBS  -continue home meds, substitute as needed    Consults: IP CONSULT TO CASE MANAGEMENT  IP CONSULT TO SOCIAL WORK    Radiology: XR FLUOROSCOPY C-ARM TIME <1 HOUR  (CPT=76000)    Res questions and state understand and agree with therapeutic plan as outlined

## 2021-05-01 NOTE — PHYSICAL THERAPY NOTE
PHYSICAL THERAPY HIP TREATMENT NOTE - INPATIENT    Room Number: 135/481-K            Presenting Problem: R ant TUCKER    Problem List  Principal Problem:    OA (osteoarthritis) of hip      PHYSICAL THERAPY ASSESSMENT   Patient received supine in bed, agreeabl RESTRICTION  Weight Bearing Restriction: R lower extremity        R Lower Extremity: Weight Bearing as Tolerated       PAIN ASSESSMENT   Rating: Unable to rate  Location: R hip  Management Techniques: Body mechanics; Activity promotion;Repositioning    BARBIE - rolling at assistance level: modified independent   Goal #3   Current Status 200ft with rolling walker CGA   Goal #4 Patient will negotiate 6 stairs/one curb w/ assistive device and supervision   Goal #4   Current Status Not tested, patient states she ha

## 2021-05-04 ENCOUNTER — LAB REQUISITION (OUTPATIENT)
Dept: LAB | Facility: HOSPITAL | Age: 81
End: 2021-05-04
Payer: MEDICARE

## 2021-05-04 DIAGNOSIS — Z47.1 AFTERCARE FOLLOWING JOINT REPLACEMENT SURGERY: ICD-10-CM

## 2021-05-04 PROCEDURE — 80048 BASIC METABOLIC PNL TOTAL CA: CPT | Performed by: INTERNAL MEDICINE

## 2021-05-10 PROBLEM — Z96.641 HISTORY OF RIGHT HIP REPLACEMENT: Status: ACTIVE | Noted: 2021-04-30

## 2022-01-24 PROBLEM — R73.03 PREDIABETES: Status: ACTIVE | Noted: 2022-01-24

## 2022-01-24 PROBLEM — G56.01 RIGHT CARPAL TUNNEL SYNDROME: Status: ACTIVE | Noted: 2022-01-24

## 2022-01-24 PROBLEM — K58.9 IRRITABLE BOWEL SYNDROME, UNSPECIFIED TYPE: Status: ACTIVE | Noted: 2022-01-24

## 2022-03-19 NOTE — LETTER
[address]  [address]  Authorization for Surgical Operation and Procedure     Date:___________                                                                                                         Time:__________  I hereby authorize Surgeon(s):  Christopher Bardales MD, my physician and his/her assistants (if applicable), which may include medical students, residents, and/or fellows, to perform the following surgical operation/ procedure and administer such anesthesia as may be determined necessary by my physician:  Operation/Procedure name (s) Procedure(s):  CYSTOSCOPY, RIGHT RETROGRADE PYELOGRAM, POSSIBLE RIGHT URETEROSCOPY,  RIGHT URETERAL STENT PLACEMENT on Arlene Veronica   2.   I recognize that during the surgical operation/procedure, unforeseen conditions may necessitate additional or different procedures than those listed above.  I, therefore, further authorize and request that the above-named surgeon, assistants, or designees perform such procedures as are, in their judgment, necessary and desirable.    3.   My surgeon/physician has discussed prior to my surgery the potential benefits, risks and side effects of this procedure; the likelihood of achieving goals; and potential problems that might occur during recuperation.  They also discussed reasonable alternatives to the procedure, including risks, benefits, and side effects related to the alternatives and risks related to not receiving this procedure.  I have had all my questions answered and I acknowledge that no guarantee has been made as to the result that may be obtained.    4.   Should the need arise during my operation/procedure, which includes change of level of care prior to discharge, I also consent to the administration of blood and/or blood products.  Further, I understand that despite careful testing and screening of blood or blood products by collecting agencies, I may still be subject to ill effects as a  result of receiving a blood transfusion and/or blood products.  The following are some, but not all, of the potential risks that can occur: fever and allergic reactions, hemolytic reactions, transmission of diseases such as Hepatitis, AIDS and Cytomegalovirus (CMV) and fluid overload.  In the event that I wish to have an autologous transfusion of my own blood, or a directed donor transfusion, I will discuss this with my physician.  Check only if Refusing Blood or Blood Products  I understand refusal of blood or blood products as deemed necessary by my physician may have serious consequences to my condition to include possible death. I hereby assume responsibility for my refusal and release the hospital, its personnel, and my physicians from any responsibility for the consequences of my refusal.          o  Refuse      5.   I authorize the use of any specimen, organs, tissues, body parts or foreign objects that may be removed from my body during the operation/procedure for diagnosis, research or teaching purposes and their subsequent disposal by hospital authorities.  I also authorize the release of specimen test results and/or written reports to my treating physician on the hospital medical staff or other referring or consulting physicians involved in my care, at the discretion of the Pathologist or my treating physician.    6.   I consent to the photographing or videotaping of the operations or procedures to be performed, including appropriate portions of my body for medical, scientific, or educational purposes, provided my identity is not revealed by the pictures or by descriptive texts accompanying them.  If the procedure has been photographed/videotaped, the surgeon will obtain the original picture, image, videotape or CD.  The hospital will not be responsible for storage, release or maintenance of the picture, image, tape or CD.    7.   I consent to the presence of a  or observers in the  operating room as deemed necessary by my physician or their designees.    8.   I recognize that in the event my procedure results in extended X-Ray/fluoroscopy time, I may develop a skin reaction.    9. If I have a Do Not Attempt Resuscitation (DNAR) order in place, that status will be suspended while in the operating room, procedural suite, and during the recovery period unless otherwise explicitly stated by me (or a person authorized to consent on my behalf). The surgeon or my attending physician will determine when the applicable recovery period ends for purposes of reinstating the DNAR order.  10. Patients having a sterilization procedure: I understand that if the procedure is successful the results will be permanent and it will therefore be impossible for me to inseminate, conceive, or bear children.  I also understand that the procedure is intended to result in sterility, although the result has not been guaranteed.   11. I acknowledge that my physician has explained sedation/analgesia administration to me including the risk and benefits I consent to the administration of sedation/analgesia as may be necessary or desirable in the judgment of my physician.    I CERTIFY THAT I HAVE READ AND FULLY UNDERSTAND THE ABOVE CONSENT TO OPERATION and/or OTHER PROCEDURE.    _________________________________________  __________________________________  Signature of Patient     Signature of Responsible Person         ___________________________________         Printed Name of Responsible Person           _________________________________                 Relationship to Patient  _________________________________________  ______________________________  Signature of Witness          Date  Time      Patient Name: Arlene Martin     : 3/7/1940                 Printed: 2024     Medical Record #: [id number]                     Page 1 of [address] [place] [address]  [place]  [address]    Consent for Anesthesia    IArlene agree to be cared for by an anesthesiologist, who is specially trained to monitor me and give me medicine to put me to sleep or keep me comfortable during my procedure    I understand that my anesthesiologist is not an employee or agent of [facility] or [employer]. He or she works for [email or web address].    As the patient asking for anesthesia services, I agree to:  Allow the anesthesiologist (anesthesia doctor) to give me medicine and do additional procedures as necessary. Some examples are: Starting or using an “IV” to give me medicine, fluids or blood during my procedure, and having a breathing tube placed to help me breathe when I’m asleep (intubation). In the event that my heart stops working properly, I understand that my anesthesiologist will make every effort to sustain my life, unless otherwise directed by [facility] Do Not Resuscitate documents.  Tell my anesthesia doctor before my procedure:  If I am pregnant.  The last time that I ate or drank.  All of the medicines I take (including prescriptions, herbal supplements, and pills I can buy without a prescription (including street drugs/illegal medications). Failure to inform my anesthesiologist about these medicines may increase my risk of anesthetic complications.  If I am allergic to anything or have had a reaction to anesthesia before.  I understand how the anesthesia medicine will help me (benefits).  I understand that with any type of anesthesia medicine there are risks:  The most common risks are: nausea, vomiting, sore throat, muscle soreness, damage to my eyes, mouth, or teeth (from breathing tube placement).  Rare risks include: remembering what happened during my procedure, allergic reactions to medications, injury to my airway, heart, lungs, vision, nerves, or muscles and in extremely rare instances death.  My doctor has explained to me other  choices available to me for my care (alternatives).  Pregnant Patients (“epidural”):  I understand that the risks of having an epidural (medicine given into my back to help control pain during labor), include itching, low blood pressure, difficulty urinating, headache or slowing of the baby’s heart. Very rare risks include infection, bleeding, seizure, irregular heart rhythms and nerve injury.  Regional Anesthesia (“spinal”, “epidural”, & “nerve blocks”):  I understand that rare but potential complications include headache, bleeding, infection, seizure, irregular heart rhythms, and nerve injury.    I can change my mind about having anesthesia services at any time before I get the medicine.    _____________________________________________________________________________  Patient (or Representative) Signature/Relationship to Patient  Date   Time    _____________________________________________________________________________   Name (if used)    Language/Organization   Time    _____________________________________________________________________________  Anesthesiologist Signature     Date   Time  I have discussed the procedure and information above with the patient (or patient’s representative) and answered their questions. The patient or their representative has agreed to have anesthesia services.    _____________________________________________________________________________  Witness        Date   Time  I have verified that the signature is that of the patient or patient’s representative, and that it was signed before the procedure  Patient Name: Arlene Martin     : 3/7/1940                 Printed: 2024     Medical Record #: [id number]                     Page 2 of 2   good, to achieve stated therapy goals

## 2024-03-18 ENCOUNTER — WALK IN (OUTPATIENT)
Dept: URGENT CARE | Age: 84
End: 2024-03-18
Attending: STUDENT IN AN ORGANIZED HEALTH CARE EDUCATION/TRAINING PROGRAM

## 2024-03-18 ENCOUNTER — HOSPITAL ENCOUNTER (OUTPATIENT)
Dept: ULTRASOUND IMAGING | Age: 84
Discharge: HOME | End: 2024-03-18
Attending: STUDENT IN AN ORGANIZED HEALTH CARE EDUCATION/TRAINING PROGRAM

## 2024-03-18 VITALS
TEMPERATURE: 97.5 F | SYSTOLIC BLOOD PRESSURE: 122 MMHG | OXYGEN SATURATION: 99 % | DIASTOLIC BLOOD PRESSURE: 84 MMHG | HEART RATE: 77 BPM | RESPIRATION RATE: 16 BRPM

## 2024-03-18 DIAGNOSIS — N13.30 HYDRONEPHROSIS, RIGHT: ICD-10-CM

## 2024-03-18 DIAGNOSIS — R10.11 ABDOMINAL PAIN, RUQ (RIGHT UPPER QUADRANT): ICD-10-CM

## 2024-03-18 DIAGNOSIS — R10.11 ABDOMINAL PAIN, RUQ (RIGHT UPPER QUADRANT): Primary | ICD-10-CM

## 2024-03-18 PROCEDURE — 99202 OFFICE O/P NEW SF 15 MIN: CPT

## 2024-03-18 PROCEDURE — 76705 ECHO EXAM OF ABDOMEN: CPT

## 2024-03-18 RX ORDER — OMEPRAZOLE 40 MG/1
40 CAPSULE, DELAYED RELEASE ORAL
COMMUNITY
Start: 2024-03-06

## 2024-03-18 RX ORDER — CHLORDIAZEPOXIDE HYDROCHLORIDE AND CLIDINIUM BROMIDE 5; 2.5 MG/1; MG/1
1 CAPSULE ORAL NIGHTLY
COMMUNITY
Start: 2024-03-14

## 2024-03-18 ASSESSMENT — PAIN SCALES - GENERAL
PAINLEVEL: 4
PAINLEVEL_OUTOF10: 4

## 2024-03-22 RX ORDER — CEFAZOLIN SODIUM/WATER 2 G/20 ML
2 SYRINGE (ML) INTRAVENOUS ONCE
Status: CANCELLED | OUTPATIENT
Start: 2024-03-22 | End: 2024-03-22

## 2024-03-22 RX ORDER — ACETAMINOPHEN 500 MG
1000 TABLET ORAL ONCE
Status: CANCELLED | OUTPATIENT
Start: 2024-03-22 | End: 2024-03-22

## 2024-03-25 ENCOUNTER — APPOINTMENT (OUTPATIENT)
Dept: GENERAL RADIOLOGY | Facility: HOSPITAL | Age: 84
End: 2024-03-25
Attending: UROLOGY
Payer: MEDICARE

## 2024-03-25 ENCOUNTER — ANESTHESIA EVENT (OUTPATIENT)
Dept: SURGERY | Facility: HOSPITAL | Age: 84
End: 2024-03-25
Payer: MEDICARE

## 2024-03-25 ENCOUNTER — ANESTHESIA (OUTPATIENT)
Dept: SURGERY | Facility: HOSPITAL | Age: 84
End: 2024-03-25
Payer: MEDICARE

## 2024-03-25 ENCOUNTER — HOSPITAL ENCOUNTER (OUTPATIENT)
Facility: HOSPITAL | Age: 84
Setting detail: HOSPITAL OUTPATIENT SURGERY
Discharge: HOME | End: 2024-03-25
Attending: UROLOGY | Admitting: UROLOGY
Payer: MEDICARE

## 2024-03-25 VITALS
WEIGHT: 126.75 LBS | RESPIRATION RATE: 16 BRPM | DIASTOLIC BLOOD PRESSURE: 79 MMHG | HEIGHT: 60 IN | HEART RATE: 53 BPM | BODY MASS INDEX: 24.88 KG/M2 | OXYGEN SATURATION: 99 % | SYSTOLIC BLOOD PRESSURE: 150 MMHG | TEMPERATURE: 98 F

## 2024-03-25 PROCEDURE — 0T768DZ DILATION OF RIGHT URETER WITH INTRALUMINAL DEVICE, VIA NATURAL OR ARTIFICIAL OPENING ENDOSCOPIC: ICD-10-PCS | Performed by: UROLOGY

## 2024-03-25 DEVICE — URETERAL STENT
Type: IMPLANTABLE DEVICE | Site: URETER | Status: FUNCTIONAL
Brand: ASCERTA™

## 2024-03-25 RX ORDER — HYDROMORPHONE HYDROCHLORIDE 1 MG/ML
0.6 INJECTION, SOLUTION INTRAMUSCULAR; INTRAVENOUS; SUBCUTANEOUS EVERY 5 MIN PRN
Status: DISCONTINUED | OUTPATIENT
Start: 2024-03-25 | End: 2024-03-25

## 2024-03-25 RX ORDER — METOCLOPRAMIDE HYDROCHLORIDE 5 MG/ML
10 INJECTION INTRAMUSCULAR; INTRAVENOUS EVERY 8 HOURS PRN
Status: DISCONTINUED | OUTPATIENT
Start: 2024-03-25 | End: 2024-03-25

## 2024-03-25 RX ORDER — ACETAMINOPHEN 500 MG
1000 TABLET ORAL ONCE
Status: DISCONTINUED | OUTPATIENT
Start: 2024-03-25 | End: 2024-03-25 | Stop reason: HOSPADM

## 2024-03-25 RX ORDER — HYDROMORPHONE HYDROCHLORIDE 1 MG/ML
0.2 INJECTION, SOLUTION INTRAMUSCULAR; INTRAVENOUS; SUBCUTANEOUS EVERY 5 MIN PRN
Status: DISCONTINUED | OUTPATIENT
Start: 2024-03-25 | End: 2024-03-25

## 2024-03-25 RX ORDER — HYDROCODONE BITARTRATE AND ACETAMINOPHEN 5; 325 MG/1; MG/1
1 TABLET ORAL ONCE AS NEEDED
Status: DISCONTINUED | OUTPATIENT
Start: 2024-03-25 | End: 2024-03-25

## 2024-03-25 RX ORDER — CEFAZOLIN SODIUM 1 G/3ML
INJECTION, POWDER, FOR SOLUTION INTRAMUSCULAR; INTRAVENOUS AS NEEDED
Status: DISCONTINUED | OUTPATIENT
Start: 2024-03-25 | End: 2024-03-25 | Stop reason: SURG

## 2024-03-25 RX ORDER — ONDANSETRON 2 MG/ML
4 INJECTION INTRAMUSCULAR; INTRAVENOUS EVERY 6 HOURS PRN
Status: DISCONTINUED | OUTPATIENT
Start: 2024-03-25 | End: 2024-03-25

## 2024-03-25 RX ORDER — ACETAMINOPHEN 500 MG
1000 TABLET ORAL ONCE AS NEEDED
Status: DISCONTINUED | OUTPATIENT
Start: 2024-03-25 | End: 2024-03-25

## 2024-03-25 RX ORDER — CEPHALEXIN 500 MG/1
500 CAPSULE ORAL 3 TIMES DAILY
Qty: 15 CAPSULE | Refills: 0 | Status: SHIPPED | OUTPATIENT
Start: 2024-03-25 | End: 2024-03-30

## 2024-03-25 RX ORDER — ONDANSETRON 2 MG/ML
INJECTION INTRAMUSCULAR; INTRAVENOUS AS NEEDED
Status: DISCONTINUED | OUTPATIENT
Start: 2024-03-25 | End: 2024-03-25 | Stop reason: SURG

## 2024-03-25 RX ORDER — SODIUM CHLORIDE, SODIUM LACTATE, POTASSIUM CHLORIDE, CALCIUM CHLORIDE 600; 310; 30; 20 MG/100ML; MG/100ML; MG/100ML; MG/100ML
INJECTION, SOLUTION INTRAVENOUS CONTINUOUS
Status: DISCONTINUED | OUTPATIENT
Start: 2024-03-25 | End: 2024-03-25

## 2024-03-25 RX ORDER — HYDROCODONE BITARTRATE AND ACETAMINOPHEN 5; 325 MG/1; MG/1
2 TABLET ORAL ONCE AS NEEDED
Status: DISCONTINUED | OUTPATIENT
Start: 2024-03-25 | End: 2024-03-25

## 2024-03-25 RX ORDER — NALOXONE HYDROCHLORIDE 0.4 MG/ML
0.08 INJECTION, SOLUTION INTRAMUSCULAR; INTRAVENOUS; SUBCUTANEOUS AS NEEDED
Status: DISCONTINUED | OUTPATIENT
Start: 2024-03-25 | End: 2024-03-25

## 2024-03-25 RX ORDER — HYDROMORPHONE HYDROCHLORIDE 1 MG/ML
0.4 INJECTION, SOLUTION INTRAMUSCULAR; INTRAVENOUS; SUBCUTANEOUS EVERY 5 MIN PRN
Status: DISCONTINUED | OUTPATIENT
Start: 2024-03-25 | End: 2024-03-25

## 2024-03-25 RX ADMIN — CEFAZOLIN SODIUM 2 G: 1 INJECTION, POWDER, FOR SOLUTION INTRAMUSCULAR; INTRAVENOUS at 18:15:00

## 2024-03-25 RX ADMIN — ONDANSETRON 4 MG: 2 INJECTION INTRAMUSCULAR; INTRAVENOUS at 18:14:00

## 2024-03-25 RX ADMIN — SODIUM CHLORIDE, SODIUM LACTATE, POTASSIUM CHLORIDE, CALCIUM CHLORIDE: 600; 310; 30; 20 INJECTION, SOLUTION INTRAVENOUS at 18:52:00

## 2024-03-25 NOTE — H&P
[facility]  H&P    Arlene Martin Patient Status:  Hospital Outpatient Surgery    3/7/1940 MRN [id number]   Location [facility] PRE OP HOLDING Attending Christopher Bardales MD   Hosp Day # 0 PCP Melina iNx MD     Impression and Plan:  Right UPJ stenosis.  She will undergo cystoscopy, right retrograde pyelogram,, possibly ureteroscopy and placement of right ureteral stent.  Christopher Bardales MD  3/25/2024  5:41 PM      History of Present Illness:  Arlene Martin developed right flank pain about 2 weeks ago.  She had a Klebsiella UTI treated and it rapidly recurred.  Renal ultrasound on 3/18/2024 showed severe right hydronephrosis and then a CT urogram on 3/19/2024 showed severe right hydronephrosis with delayed nephrogram and narrowing at the level of the ureteropelvic junction.  No stones.    Allergies:  Allergies   Allergen Reactions    No Known Allergies        Medications:    Outpatient Medications Marked as Taking for the 3/25/24 encounter (Hospital Encounter)   Medication Sig Dispense Refill    aspirin 81 MG Oral Tab EC Take 1 tablet (81 mg total) by mouth daily. 60 tablet 0    omeprazole 20 MG Oral Capsule Delayed Release Take 1 capsule (20 mg total) by mouth daily. (Patient taking differently: Take 2 capsules (40 mg total) by mouth daily.) 90 capsule 3    conjugated estrogens 0.625 MG/GM Vaginal Cream Place 0.5 g vaginally twice a week. 2 x week 1 each 5    chlordiazepoxide-clidinium 5-2.5 MG Oral Cap Take 1 capsule by mouth nightly.      acetaminophen 500 MG Oral Tab Take 2 tablets (1,000 mg total) by mouth every 6 (six) hours as needed for Pain. 90 tablet 1    Cholecalciferol (VITAMIN D) 50 MCG ( UT) Oral Cap Take 1 capsule (2,000 Units total) by mouth daily.      DAILY MULTIVITAMIN OR 1 TAB A DAY         Current Facility-Administered Medications   Medication Dose Route Frequency    lactated ringers infusion   Intravenous Continuous    [MAR Hold] acetaminophen (Tylenol Extra Strength) tab 1,000 mg   1,000 mg Oral Once    lactated ringers infusion   Intravenous Continuous       History:  Past Medical History:   Diagnosis Date    Esophageal reflux     History of blood transfusion     possibly with knee surgery     IBS (irritable bowel syndrome)     Osteoarthritis        Past Surgical History:   Procedure Laterality Date    APPENDECTOMY      COLONOSCOPY N/A 2017    Procedure: COLONOSCOPY, POSSIBLE BIOPSY, POSSIBLE POLYPECTOMY 34233;  Surgeon: Juancarlos Suazo MD;  Location: [facility]    HERNIA SURGERY Right     mesh, around yr     HIP REPLACEMENT SURGERY Right 2021    dr nance    OTHER SURGICAL HISTORY      right shoulder surg.      PATIENT DOCUMENTED NOT TO HAVE EXPERIENCED ANY OF THE FOLLOWING EVENTS  3/25/2013    Procedure: ESOPHAGOGASTRODUODENOSCOPY W/ DILATION;  Surgeon: Juancarlos Suazo MD;  Location: [facility]    PATIENT WITHOUGH PREOPERATIVE ORDER FOR IV ANTIBIOTIC SURGICAL SITE INFECTION PROPHYLAXIS.  3/25/2013    Procedure: ESOPHAGOGASTRODUODENOSCOPY W/ DILATION;  Surgeon: Juancarlos Suazo MD;  Location: [facility]    REPR VAGINAL PROLAPSE,SACROSP LIG      TOTAL HIP REPLACEMENT      TOTAL KNEE REPLACEMENT Right     @[facility]    UPPER GI ENDOSCOPY,DIAGNOSIS  3/18/09    Performed by JUANCARLOS SUAZO at [facility]    UPPER GI ENDOSCOPY,DIAGNOSIS  3/25/2013    Procedure: ESOPHAGOGASTRODUODENOSCOPY, POSSIBLE BIOPSY, POSSIBLE POLYPECTOMY 72441;  Surgeon: Juancarlos Suazo MD;  Location: [facility]       Family History   Problem Relation Age of Onset    Other ( at war) Father     Cancer Mother         Bladder       Social History     Socioeconomic History    Marital status:      Spouse name: Not on file    Number of children: Not on file    Years of education: Not on file    Highest education level: Not on file   Occupational History    Not on file   Tobacco Use    Smoking status: Never    Smokeless  tobacco: Never   Vaping Use    Vaping Use: Never used   Substance and Sexual Activity    Alcohol use: Yes     Alcohol/week: 2.0 standard drinks of alcohol     Types: 2 Standard drinks or equivalent per week    Drug use: No    Sexual activity: Not on file   Other Topics Concern    Not on file   Social History Narrative    Not on file     Social Determinants of Health     Financial Resource Strain: Not on file   Food Insecurity: Not on file   Transportation Needs: Not on file   Physical Activity: Not on file   Stress: Not on file   Social Connections: Not on file   Housing Stability: Not on file       Review of Systems:  No SOB, angina, fevers, focal weakness, and as in HPI.    Physical Exam:  Pulse 62   Temp (!) 96 °F (35.6 °C) (Temporal)   Resp 16   Ht 5' (1.524 m)   Wt 126 lb 12.2 oz (57.5 kg)   SpO2 97%   BMI 24.76 kg/m²   General: Alert, orientated x3.  Cooperative.  No apparent distress.  HEENT: Exam is unremarkable.   Lungs: Clear to auscultation bilaterally.  Cardiac: Regular rate and rhythm. No murmur.  Abdomen:  Soft, non-distended, non-tender, with no rebound or guarding.  No peritoneal signs. No ascites.  Liver is within normal limits.  Spleen is not palpable.    Genitourinary: Penis; no lesions. Testicles;no masses. No flank tenderness. Prostate;  Extremities:  No lower extremity edema noted.  Without clubbing or cyanosis.    Skin: Normal texture and turgor.  Neurologic:  Motor strength and sensory examination is grossly normal.  No focal neurologic deficit.    Laboratory Data:   Creatinine 0.6  Christopher Bardales M.D.  [facility], Urology   [phone]

## 2024-03-25 NOTE — ANESTHESIA PREPROCEDURE EVALUATION
PRE-OP EVALUATION    Patient Name: Arlene Martin    Admit Diagnosis: HYDRONEPHROSIS    Pre-op Diagnosis: HYDRONEPHROSIS    CYSTOSCOPY, RIGHT RETROGRADE PYELOGRAM, POSSIBLE RIGHT URETEROSCOPY,  RIGHT URETERAL STENT PLACEMENT    Anesthesia Procedure: CYSTOSCOPY, RIGHT RETROGRADE PYELOGRAM, POSSIBLE RIGHT URETEROSCOPY,  RIGHT URETERAL STENT PLACEMENT (Right)    Surgeon(s) and Role:     * Christopher Bardales MD - Primary    Pre-op vitals reviewed.  Temp: 96 °F (35.6 °C)  Pulse: 62  Resp: 16  SpO2: 97 %  Body mass index is 24.76 kg/m².    Current medications reviewed.  Hospital Medications:   lactated ringers infusion   Intravenous Continuous    acetaminophen (Tylenol Extra Strength) tab 1,000 mg  1,000 mg Oral Once    lactated ringers infusion   Intravenous Continuous       Outpatient Medications:     Medications Prior to Admission   Medication Sig Dispense Refill Last Dose    aspirin 81 MG Oral Tab EC Take 1 tablet (81 mg total) by mouth daily. 60 tablet 0 3/14/2024    omeprazole 20 MG Oral Capsule Delayed Release Take 1 capsule (20 mg total) by mouth daily. (Patient taking differently: Take 2 capsules (40 mg total) by mouth daily.) 90 capsule 3 3/25/2024 at 0400    conjugated estrogens 0.625 MG/GM Vaginal Cream Place 0.5 g vaginally twice a week. 2 x week 1 each 5     chlordiazepoxide-clidinium 5-2.5 MG Oral Cap Take 1 capsule by mouth nightly.   3/23/2024    acetaminophen 500 MG Oral Tab Take 2 tablets (1,000 mg total) by mouth every 6 (six) hours as needed for Pain. 90 tablet 1 3/25/2024 at 1200    Cholecalciferol (VITAMIN D) 50 MCG (2000 UT) Oral Cap Take 1 capsule (2,000 Units total) by mouth daily.   3/14/2024    DAILY MULTIVITAMIN OR 1 TAB A DAY   3/14/2024       Allergies: No known allergies      Anesthesia Evaluation    Patient summary reviewed.    Anesthetic Complications  (-) history of anesthetic complications         GI/Hepatic/Renal      (+) GERD                      (+) irritable bowel syndrome      Cardiovascular                     (+) hyperlipidemia                                  Endo/Other                           (+) arthritis       Pulmonary    Negative pulmonary ROS.                       Neuro/Psych                                  Past Surgical History:   Procedure Laterality Date    APPENDECTOMY      COLONOSCOPY N/A 1/17/2017    Procedure: COLONOSCOPY, POSSIBLE BIOPSY, POSSIBLE POLYPECTOMY 75010;  Surgeon: Juancarlos Suazo MD;  Location: [facility]    HERNIA SURGERY Right     mesh, around yr 2000    HIP REPLACEMENT SURGERY Right 04/28/2021    dr nance    OTHER SURGICAL HISTORY      right shoulder surg.      PATIENT DOCUMENTED NOT TO HAVE EXPERIENCED ANY OF THE FOLLOWING EVENTS  3/25/2013    Procedure: ESOPHAGOGASTRODUODENOSCOPY W/ DILATION;  Surgeon: Juancarlos Suazo MD;  Location: [facility]    PATIENT WITHOUGH PREOPERATIVE ORDER FOR IV ANTIBIOTIC SURGICAL SITE INFECTION PROPHYLAXIS.  3/25/2013    Procedure: ESOPHAGOGASTRODUODENOSCOPY W/ DILATION;  Surgeon: Juancarlos Suazo MD;  Location: [facility]    REPR VAGINAL PROLAPSE,SACROSP LIG      TOTAL HIP REPLACEMENT      TOTAL KNEE REPLACEMENT Right 2010    @[facility]    UPPER GI ENDOSCOPY,DIAGNOSIS  3/18/09    Performed by JUANCARLOS SUAZO at [facility]    UPPER GI ENDOSCOPY,DIAGNOSIS  3/25/2013    Procedure: ESOPHAGOGASTRODUODENOSCOPY, POSSIBLE BIOPSY, POSSIBLE POLYPECTOMY 37955;  Surgeon: Juancarlos Suazo MD;  Location: [facility]     Social History     Socioeconomic History    Marital status:    Tobacco Use    Smoking status: Never    Smokeless tobacco: Never   Vaping Use    Vaping Use: Never used   Substance and Sexual Activity    Alcohol use: Yes     Alcohol/week: 2.0 standard drinks of alcohol     Types: 2 Standard drinks or equivalent per week    Drug use: No     History   Drug Use No     Available pre-op labs reviewed.               Airway      Mallampati:  I  Mouth opening: >3 FB  TM distance: 4 - 6 cm  Neck ROM: full Cardiovascular    Cardiovascular exam normal.  Rhythm: regular  Rate: normal  (-) murmur   Dental  Comment: Dentition intact            Pulmonary    Pulmonary exam normal.  Breath sounds clear to auscultation bilaterally.               Other findings              ASA: 2   Plan: general  NPO status verified and patient meets guidelines.  Patient has not taken beta blockers in last 24 hours.  Post-procedure pain management plan discussed with surgeon and patient.      Plan/risks discussed with: patient  Use of blood product(s) discussed with: patient    Consented to blood products.        Present on Admission:  **None**

## 2024-03-25 NOTE — ANESTHESIA POSTPROCEDURE EVALUATION
[facility]    Arlene Martin Patient Status:  Hospital Outpatient Surgery   Age/Gender 84 year old female MRN [id number]   Location [facility] SURGERY Attending Christopher Bardales MD   Hosp Day # 0 PCP Melina Nix MD       Anesthesia Post-op Note    CYSTOSCOPY, RIGHT RETROGRADE PYELOGRAM, RIGHT URETEROSCOPY,  RIGHT URETERAL STENT PLACEMENT    Procedure Summary       Date: 03/25/24 Room / Location:  MAIN OR 07 /  MAIN OR    Anesthesia Start: 1759 Anesthesia Stop:     Procedure: CYSTOSCOPY, RIGHT RETROGRADE PYELOGRAM, RIGHT URETEROSCOPY,  RIGHT URETERAL STENT PLACEMENT (Right: Ureter) Diagnosis: (HYDRONEPHROSIS)    Surgeons: Christopher Bardales MD Anesthesiologist: Vanesa Montano DO    Anesthesia Type: MAC ASA Status: 2            Anesthesia Type: MAC    Vitals Value Taken Time   /72 03/25/24 1852   Temp 98.4 °F (36.9 °C) 03/25/24 1852   Pulse 65 03/25/24 1852   Resp 16 03/25/24 1852   SpO2 98 % 03/25/24 1852       Patient Location: PACU    Anesthesia Type: MAC    Airway Patency: patent    Postop Pain Control: adequate    Mental Status: preanesthetic baseline    Nausea/Vomiting: none    Cardiopulmonary/Hydration status: stable euvolemic    Complications: no apparent anesthesia related complications    Postop vital signs: stable    Dental Exam: Unchanged from Preop    Patient to be discharged home when criteria met.

## 2024-03-26 NOTE — DISCHARGE INSTRUCTIONS
Call if you have a fever.  Dr. Bardales prescribed a couple more days of antibiotics.  Please call Dr. Bardales's office tomorrow and schedule an appointment for postoperative visit in 2 weeks to discuss the intraoperative findings.  I think you will probably need laparoscopic, robotic assisted ureteropelvic junction repair, however we could consider leaving the stent in for a month and then simply removing it in the office to see if the pain recurs.          Home Care Instructions Following Your Cystoscopy     Arlene-  We hope you were pleased with your care at [facility].  We wish you the best outcome and overall experience with your operation.  These instructions will help to minimize pain, prevent an infection, and improve the likelihood of a successful result.    You Should Expect:  Bloody urine for one week  Burning with urination for one week    Over-The-Counter Medication:  Azo (phenazopyridine hydrochloride 97.5 mg) can help to alleviate burning with urination and bladder spasms  Take 2 tablets 3 times daily with meals as needed  Take with a full glass of water  You can also take OTC Tylenol and/or ibuprofen as needed for pain.  Take medication as directed on the bottle    Home Medication  Resume your home medications as instructed    Bathing/Showers  You can resume baths, showering, swimming, and hot tubs tomorrow    Diet  Resume your normal diet    Activity  No strenuous activity or heavy lifting for one month, if you had a biopsy or tissue was removed.  Refrain from physical exercise or gym workouts for one month, if you had a biopsy or tissue removed.  If you did not have a biopsy or tissue removed, you can resume normal activity as tolerated.  You can go up and down the stairs as tolerated.  Use common sense  You cannot return to work, if your work requires strenuous activity.    Driving  Do not drive, if you are taking pain medication.    Return to Work or School  You can return to work tomorrow, if  your work does not involve strenuous activity.  Contact Dr. Bardales's office, if you need a medical note.     Follow-up Appointment with Dr. Lyon  Call Dr. Bardales's office tomorrow for an appointment as discussed .    Verify the appointment date, time, and office location when you call.  Dr. Bardales will assess your progress and discuss any additional concerns.    Questions or Concerns  Call Dr. Bardales immediately if you have any of the following:   Chills or fever above 100.4°F (38°C)   Increased spasms (uncontrollable twitching) in your legs, abdomen, or bladder (occasional mild spasms are normal)   Nausea and vomiting  Large blood clots in your urine   Unable to urinate or having difficulty with urination   If your call is made after office hours, a physician's assistant or nurse practitioner will be available to help you.  There is always a surgeon covering Dr. Bardales's patients, if he is unavailable.    Arlene  Thank you for coming to [facility] for your operation.  The nurses and the anesthesiologist try very hard to make sure you receive the best care possible.  Your trust in them is greatly appreciated.    Thanks so much,  Dr. Bardales

## 2024-03-26 NOTE — OPERATIVE REPORT
[facility]   OPERATIVE REPORT    Arlene Martin Patient Status:  Hospital Outpatient Surgery    3/7/1940 MRN [id number]   Location [facility] SURGERY Attending Christopher Bardales MD    Day # 0 PCP Melina Nix MD        DATE OF OPERATION; 3/25/2024      PREOPERATIVE DIAGNOSIS: Right Hydronephrosis    POSTOPERATIVE DIAGNOSIS: Same    SURGEON: CHRISTOPHER BARDALES M.D.    PROCEDURE PERFORMED: Cystoscopy, Right Retrograde Pyelogram, and Placement of Ureteral Stent    ANESTHESIA:  General.     INDICATIONS: Right hydronephrosis down to the ureteropelvic junction.  Good preservation of renal parenchyma.  No stone.  Recurrent Klebsiella UTI.  The flank pain subsided a couple of days ago.     FINDINGS: Marked right hydronephrosis down to the level of the UPJ, but there was no stenosis when I passed the ureteroscope and I suspect that there is a crossing vessel causing the obstruction as the position of the kidney changes with age.  She will probably need a right pyeloplasty, however we could consider simply taking out the stent after her UTI is treated again and monitoring her symptoms.    EBL: None    COMPLICATIONS: None       TECHNIQUE:  A general anesthesia was induced.  A time-out was  reviewed.  Preoperative antibiotics were administered.  The patient  was prepped and draped in the dorsal lithotomy position.  Sequential  compression devices were in place on the lower legs.     The cystoscope was passed into the bladder and the bladder was  examined.  The ureteral orifices were in normal position.  There were no mucosal lesions.  The Cystoscope and floroscopy  were used to guide a guidewire into the right  ureteral orifice  and water soluble contrast was injected retrograde.  There is a normal appearing distal and mid ureter but then there was severe tortuosity of the proximal ureter as if the ureter draped around something came back down and then went up into the kidney.  There was moderate to severe hydronephrosis.  I  passed a guidewire up into the ureter and manipulated it into the kidney.  The ureter was dilated with a 10 Ivorian dual-lumen catheter through the level of the ureterovesical junction and then the DE 6 flexible ureteroscope was advanced over the guidewire.  The ureteroscope was easily advanced over the guidewire through the ureteropelvic junction and there was no stenosis.  I examined the intrarenal collecting system and there were no lesions or stones.  I suspect that there was some crossing vessel causing UPJ obstruction.       A  6-Ivorian 22-cm  stent was then passed over a guidewire up into the ureter.  The  stent was in good position when the guidewire was removed.The patient was  awakened, and taken to the recovery area in good condition.     [facility]  [phone]  3/25/2024  7:07 PM

## 2024-06-10 ENCOUNTER — LABORATORY ENCOUNTER (OUTPATIENT)
Dept: LAB | Age: 84
End: 2024-06-10
Attending: UROLOGY
Payer: MEDICARE

## 2024-06-10 DIAGNOSIS — Z01.818 PRE-OP TESTING: ICD-10-CM

## 2024-06-10 LAB
ANTIBODY SCREEN: NEGATIVE
RH BLOOD TYPE: POSITIVE

## 2024-06-10 PROCEDURE — 86850 RBC ANTIBODY SCREEN: CPT

## 2024-06-10 PROCEDURE — 86901 BLOOD TYPING SEROLOGIC RH(D): CPT

## 2024-06-10 PROCEDURE — 86900 BLOOD TYPING SEROLOGIC ABO: CPT

## 2024-06-24 ENCOUNTER — ANESTHESIA EVENT (OUTPATIENT)
Dept: SURGERY | Facility: HOSPITAL | Age: 84
DRG: 661 | End: 2024-06-24
Payer: MEDICARE

## 2024-06-25 ENCOUNTER — ANESTHESIA (OUTPATIENT)
Dept: SURGERY | Facility: HOSPITAL | Age: 84
DRG: 661 | End: 2024-06-25
Payer: MEDICARE

## 2024-06-25 ENCOUNTER — HOSPITAL ENCOUNTER (INPATIENT)
Facility: HOSPITAL | Age: 84
LOS: 2 days | Discharge: HOME | DRG: 661 | End: 2024-06-27
Attending: UROLOGY | Admitting: UROLOGY
Payer: MEDICARE

## 2024-06-25 DIAGNOSIS — K21.9 GASTROESOPHAGEAL REFLUX DISEASE: ICD-10-CM

## 2024-06-25 DIAGNOSIS — N13.5 OBSTRUCTION OF LEFT URETEROPELVIC JUNCTION (UPJ): ICD-10-CM

## 2024-06-25 DIAGNOSIS — Z01.818 PRE-OP TESTING: Primary | ICD-10-CM

## 2024-06-25 PROCEDURE — 76942 ECHO GUIDE FOR BIOPSY: CPT | Performed by: STUDENT IN AN ORGANIZED HEALTH CARE EDUCATION/TRAINING PROGRAM

## 2024-06-25 PROCEDURE — 0T764DZ DILATION OF RIGHT URETER WITH INTRALUMINAL DEVICE, PERCUTANEOUS ENDOSCOPIC APPROACH: ICD-10-PCS | Performed by: UROLOGY

## 2024-06-25 PROCEDURE — 3E0T3BZ INTRODUCTION OF ANESTHETIC AGENT INTO PERIPHERAL NERVES AND PLEXI, PERCUTANEOUS APPROACH: ICD-10-PCS | Performed by: UROLOGY

## 2024-06-25 PROCEDURE — 0TQ34ZZ REPAIR RIGHT KIDNEY PELVIS, PERCUTANEOUS ENDOSCOPIC APPROACH: ICD-10-PCS | Performed by: UROLOGY

## 2024-06-25 PROCEDURE — 8E0W4CZ ROBOTIC ASSISTED PROCEDURE OF TRUNK REGION, PERCUTANEOUS ENDOSCOPIC APPROACH: ICD-10-PCS | Performed by: UROLOGY

## 2024-06-25 PROCEDURE — 0TP94DZ REMOVAL OF INTRALUMINAL DEVICE FROM URETER, PERCUTANEOUS ENDOSCOPIC APPROACH: ICD-10-PCS | Performed by: UROLOGY

## 2024-06-25 DEVICE — URETERAL STENT
Type: IMPLANTABLE DEVICE | Site: URETER | Status: FUNCTIONAL
Brand: ASCERTA™

## 2024-06-25 RX ORDER — EPHEDRINE SULFATE 50 MG/ML
INJECTION INTRAVENOUS AS NEEDED
Status: DISCONTINUED | OUTPATIENT
Start: 2024-06-25 | End: 2024-06-25 | Stop reason: SURG

## 2024-06-25 RX ORDER — SODIUM CHLORIDE, SODIUM LACTATE, POTASSIUM CHLORIDE, CALCIUM CHLORIDE 600; 310; 30; 20 MG/100ML; MG/100ML; MG/100ML; MG/100ML
INJECTION, SOLUTION INTRAVENOUS CONTINUOUS
Status: DISCONTINUED | OUTPATIENT
Start: 2024-06-25 | End: 2024-06-25

## 2024-06-25 RX ORDER — ACETAMINOPHEN 325 MG/1
650 TABLET ORAL
Status: DISCONTINUED | OUTPATIENT
Start: 2024-06-25 | End: 2024-06-27

## 2024-06-25 RX ORDER — FAMOTIDINE 10 MG/ML
20 INJECTION, SOLUTION INTRAVENOUS 2 TIMES DAILY
Status: DISCONTINUED | OUTPATIENT
Start: 2024-06-25 | End: 2024-06-25

## 2024-06-25 RX ORDER — METOCLOPRAMIDE HYDROCHLORIDE 5 MG/ML
10 INJECTION INTRAMUSCULAR; INTRAVENOUS EVERY 8 HOURS PRN
Status: DISCONTINUED | OUTPATIENT
Start: 2024-06-25 | End: 2024-06-27

## 2024-06-25 RX ORDER — ONDANSETRON 2 MG/ML
INJECTION INTRAMUSCULAR; INTRAVENOUS AS NEEDED
Status: DISCONTINUED | OUTPATIENT
Start: 2024-06-25 | End: 2024-06-25 | Stop reason: SURG

## 2024-06-25 RX ORDER — ALBUTEROL SULFATE 2.5 MG/3ML
2.5 SOLUTION RESPIRATORY (INHALATION) AS NEEDED
Status: DISCONTINUED | OUTPATIENT
Start: 2024-06-25 | End: 2024-06-25 | Stop reason: HOSPADM

## 2024-06-25 RX ORDER — HYDROCODONE BITARTRATE AND ACETAMINOPHEN 5; 325 MG/1; MG/1
1 TABLET ORAL ONCE AS NEEDED
Status: DISCONTINUED | OUTPATIENT
Start: 2024-06-25 | End: 2024-06-25 | Stop reason: HOSPADM

## 2024-06-25 RX ORDER — HYDROMORPHONE HYDROCHLORIDE 1 MG/ML
INJECTION, SOLUTION INTRAMUSCULAR; INTRAVENOUS; SUBCUTANEOUS
Status: COMPLETED
Start: 2024-06-25 | End: 2024-06-25

## 2024-06-25 RX ORDER — METOCLOPRAMIDE HYDROCHLORIDE 5 MG/ML
10 INJECTION INTRAMUSCULAR; INTRAVENOUS EVERY 8 HOURS PRN
Status: DISCONTINUED | OUTPATIENT
Start: 2024-06-25 | End: 2024-06-25 | Stop reason: HOSPADM

## 2024-06-25 RX ORDER — ACETAMINOPHEN 500 MG
1000 TABLET ORAL ONCE
Status: DISCONTINUED | OUTPATIENT
Start: 2024-06-25 | End: 2024-06-25 | Stop reason: HOSPADM

## 2024-06-25 RX ORDER — LABETALOL HYDROCHLORIDE 5 MG/ML
5 INJECTION, SOLUTION INTRAVENOUS EVERY 5 MIN PRN
Status: DISCONTINUED | OUTPATIENT
Start: 2024-06-25 | End: 2024-06-25 | Stop reason: HOSPADM

## 2024-06-25 RX ORDER — BUPIVACAINE HYDROCHLORIDE 2.5 MG/ML
INJECTION, SOLUTION EPIDURAL; INFILTRATION; INTRACAUDAL AS NEEDED
Status: DISCONTINUED | OUTPATIENT
Start: 2024-06-25 | End: 2024-06-25 | Stop reason: HOSPADM

## 2024-06-25 RX ORDER — HEPARIN SODIUM 5000 [USP'U]/ML
5000 INJECTION, SOLUTION INTRAVENOUS; SUBCUTANEOUS ONCE
Status: COMPLETED | OUTPATIENT
Start: 2024-06-25 | End: 2024-06-25

## 2024-06-25 RX ORDER — HYDROMORPHONE HYDROCHLORIDE 1 MG/ML
0.4 INJECTION, SOLUTION INTRAMUSCULAR; INTRAVENOUS; SUBCUTANEOUS EVERY 5 MIN PRN
Status: DISCONTINUED | OUTPATIENT
Start: 2024-06-25 | End: 2024-06-25 | Stop reason: HOSPADM

## 2024-06-25 RX ORDER — ENOXAPARIN SODIUM 100 MG/ML
40 INJECTION SUBCUTANEOUS DAILY
Status: DISCONTINUED | OUTPATIENT
Start: 2024-06-26 | End: 2024-06-27

## 2024-06-25 RX ORDER — PHENYLEPHRINE HCL 10 MG/ML
VIAL (ML) INJECTION AS NEEDED
Status: DISCONTINUED | OUTPATIENT
Start: 2024-06-25 | End: 2024-06-25 | Stop reason: SURG

## 2024-06-25 RX ORDER — FAMOTIDINE 20 MG/1
20 TABLET, FILM COATED ORAL 2 TIMES DAILY
Status: DISCONTINUED | OUTPATIENT
Start: 2024-06-25 | End: 2024-06-25

## 2024-06-25 RX ORDER — SODIUM CHLORIDE, SODIUM LACTATE, POTASSIUM CHLORIDE, CALCIUM CHLORIDE 600; 310; 30; 20 MG/100ML; MG/100ML; MG/100ML; MG/100ML
INJECTION, SOLUTION INTRAVENOUS CONTINUOUS
Status: DISCONTINUED | OUTPATIENT
Start: 2024-06-25 | End: 2024-06-25 | Stop reason: HOSPADM

## 2024-06-25 RX ORDER — GLYCOPYRROLATE 0.2 MG/ML
INJECTION, SOLUTION INTRAMUSCULAR; INTRAVENOUS AS NEEDED
Status: DISCONTINUED | OUTPATIENT
Start: 2024-06-25 | End: 2024-06-25 | Stop reason: SURG

## 2024-06-25 RX ORDER — ACETAMINOPHEN 500 MG
1000 TABLET ORAL ONCE AS NEEDED
Status: DISCONTINUED | OUTPATIENT
Start: 2024-06-25 | End: 2024-06-25 | Stop reason: HOSPADM

## 2024-06-25 RX ORDER — ROCURONIUM BROMIDE 10 MG/ML
INJECTION, SOLUTION INTRAVENOUS AS NEEDED
Status: DISCONTINUED | OUTPATIENT
Start: 2024-06-25 | End: 2024-06-25 | Stop reason: SURG

## 2024-06-25 RX ORDER — HYDROMORPHONE HYDROCHLORIDE 1 MG/ML
0.2 INJECTION, SOLUTION INTRAMUSCULAR; INTRAVENOUS; SUBCUTANEOUS EVERY 5 MIN PRN
Status: DISCONTINUED | OUTPATIENT
Start: 2024-06-25 | End: 2024-06-25 | Stop reason: HOSPADM

## 2024-06-25 RX ORDER — NALOXONE HYDROCHLORIDE 0.4 MG/ML
80 INJECTION, SOLUTION INTRAMUSCULAR; INTRAVENOUS; SUBCUTANEOUS AS NEEDED
Status: DISCONTINUED | OUTPATIENT
Start: 2024-06-25 | End: 2024-06-25 | Stop reason: HOSPADM

## 2024-06-25 RX ORDER — DEXAMETHASONE SODIUM PHOSPHATE 4 MG/ML
VIAL (ML) INJECTION AS NEEDED
Status: DISCONTINUED | OUTPATIENT
Start: 2024-06-25 | End: 2024-06-25 | Stop reason: SURG

## 2024-06-25 RX ORDER — ONDANSETRON 2 MG/ML
4 INJECTION INTRAMUSCULAR; INTRAVENOUS EVERY 6 HOURS PRN
Status: DISCONTINUED | OUTPATIENT
Start: 2024-06-25 | End: 2024-06-25 | Stop reason: HOSPADM

## 2024-06-25 RX ORDER — CHLORDIAZEPOXIDE HYDROCHLORIDE AND CLIDINIUM BROMIDE 5; 2.5 MG/1; MG/1
1 CAPSULE ORAL NIGHTLY
Status: DISCONTINUED | OUTPATIENT
Start: 2024-06-25 | End: 2024-06-27

## 2024-06-25 RX ORDER — LABETALOL HYDROCHLORIDE 5 MG/ML
INJECTION, SOLUTION INTRAVENOUS AS NEEDED
Status: DISCONTINUED | OUTPATIENT
Start: 2024-06-25 | End: 2024-06-25 | Stop reason: SURG

## 2024-06-25 RX ORDER — SENNOSIDES 8.6 MG
17.2 TABLET ORAL NIGHTLY
Status: DISCONTINUED | OUTPATIENT
Start: 2024-06-25 | End: 2024-06-27

## 2024-06-25 RX ORDER — HYDROMORPHONE HYDROCHLORIDE 1 MG/ML
0.6 INJECTION, SOLUTION INTRAMUSCULAR; INTRAVENOUS; SUBCUTANEOUS EVERY 5 MIN PRN
Status: DISCONTINUED | OUTPATIENT
Start: 2024-06-25 | End: 2024-06-25 | Stop reason: HOSPADM

## 2024-06-25 RX ORDER — DOCUSATE SODIUM 100 MG/1
100 CAPSULE, LIQUID FILLED ORAL 2 TIMES DAILY
Status: DISCONTINUED | OUTPATIENT
Start: 2024-06-25 | End: 2024-06-27

## 2024-06-25 RX ORDER — HYDROCODONE BITARTRATE AND ACETAMINOPHEN 5; 325 MG/1; MG/1
2 TABLET ORAL ONCE AS NEEDED
Status: DISCONTINUED | OUTPATIENT
Start: 2024-06-25 | End: 2024-06-25 | Stop reason: HOSPADM

## 2024-06-25 RX ORDER — PANTOPRAZOLE SODIUM 20 MG/1
20 TABLET, DELAYED RELEASE ORAL
Status: DISCONTINUED | OUTPATIENT
Start: 2024-06-26 | End: 2024-06-27

## 2024-06-25 RX ORDER — ONDANSETRON 2 MG/ML
4 INJECTION INTRAMUSCULAR; INTRAVENOUS EVERY 6 HOURS PRN
Status: DISCONTINUED | OUTPATIENT
Start: 2024-06-25 | End: 2024-06-27

## 2024-06-25 RX ORDER — TRAMADOL HYDROCHLORIDE 50 MG/1
50 TABLET ORAL EVERY 6 HOURS SCHEDULED
Status: DISCONTINUED | OUTPATIENT
Start: 2024-06-25 | End: 2024-06-27

## 2024-06-25 RX ORDER — LIDOCAINE HYDROCHLORIDE 10 MG/ML
INJECTION, SOLUTION EPIDURAL; INFILTRATION; INTRACAUDAL; PERINEURAL AS NEEDED
Status: DISCONTINUED | OUTPATIENT
Start: 2024-06-25 | End: 2024-06-25 | Stop reason: SURG

## 2024-06-25 RX ORDER — SODIUM CHLORIDE 9 MG/ML
INJECTION, SOLUTION INTRAVENOUS CONTINUOUS
Status: DISCONTINUED | OUTPATIENT
Start: 2024-06-25 | End: 2024-06-27

## 2024-06-25 RX ADMIN — DEXAMETHASONE SODIUM PHOSPHATE 4 MG: 4 MG/ML VIAL (ML) INJECTION at 14:40:00

## 2024-06-25 RX ADMIN — ONDANSETRON 4 MG: 2 INJECTION INTRAMUSCULAR; INTRAVENOUS at 16:10:00

## 2024-06-25 RX ADMIN — PHENYLEPHRINE HCL 100 MCG: 10 MG/ML VIAL (ML) INJECTION at 14:35:00

## 2024-06-25 RX ADMIN — PHENYLEPHRINE HCL 100 MCG: 10 MG/ML VIAL (ML) INJECTION at 15:10:00

## 2024-06-25 RX ADMIN — SODIUM CHLORIDE, SODIUM LACTATE, POTASSIUM CHLORIDE, CALCIUM CHLORIDE: 600; 310; 30; 20 INJECTION, SOLUTION INTRAVENOUS at 14:30:00

## 2024-06-25 RX ADMIN — EPHEDRINE SULFATE 5 MG: 50 INJECTION INTRAVENOUS at 14:55:00

## 2024-06-25 RX ADMIN — GLYCOPYRROLATE 0.2 MG: 0.2 INJECTION, SOLUTION INTRAMUSCULAR; INTRAVENOUS at 14:55:00

## 2024-06-25 RX ADMIN — LIDOCAINE HYDROCHLORIDE 50 MG: 10 INJECTION, SOLUTION EPIDURAL; INFILTRATION; INTRACAUDAL; PERINEURAL at 14:35:00

## 2024-06-25 RX ADMIN — ROCURONIUM BROMIDE 50 MG: 10 INJECTION, SOLUTION INTRAVENOUS at 14:35:00

## 2024-06-25 RX ADMIN — PHENYLEPHRINE HCL 100 MCG: 10 MG/ML VIAL (ML) INJECTION at 15:30:00

## 2024-06-25 RX ADMIN — LABETALOL HYDROCHLORIDE 5 MG: 5 INJECTION, SOLUTION INTRAVENOUS at 16:10:00

## 2024-06-25 RX ADMIN — PHENYLEPHRINE HCL 100 MCG: 10 MG/ML VIAL (ML) INJECTION at 15:50:00

## 2024-06-25 RX ADMIN — PHENYLEPHRINE HCL 100 MCG: 10 MG/ML VIAL (ML) INJECTION at 14:45:00

## 2024-06-25 NOTE — ANESTHESIA PREPROCEDURE EVALUATION
PRE-OP EVALUATION    Patient Name: Arlene Martin    Admit Diagnosis: OBSTRUCTION OF RIGHT URETEROPELVIC JUNCTION    Pre-op Diagnosis: OBSTRUCTION OF RIGHT URETEROPELVIC JUNCTION    XI ROBOTIC ASSISTED LAPAROSCOPIC RIGHT DISMEMBERED PYELOPLASTY, EXCHANGE OF RIGHT URETER STENT    Anesthesia Procedure: XI ROBOTIC ASSISTED LAPAROSCOPIC RIGHT DISMEMBERED PYELOPLASTY, EXCHANGE OF RIGHT URETER STENT (Right: Abdomen)    Surgeons and Role:     * Nidia Rivero MD - Primary    Pre-op vitals reviewed.  Temp: 97.4 °F (36.3 °C)  Pulse: 51  Resp: 16  BP: 154/86  SpO2: 98 %  Body mass index is 25.32 kg/m².    Current medications reviewed.  Hospital Medications:   acetaminophen (Tylenol Extra Strength) tab 1,000 mg  1,000 mg Oral Once    lactated ringers infusion   Intravenous Continuous    [COMPLETED] heparin (Porcine) 5000 UNIT/ML injection 5,000 Units  5,000 Units Subcutaneous Once    ceFAZolin (Ancef) 2g in 10mL IV syringe premix  2 g Intravenous Once       Outpatient Medications:     Medications Prior to Admission   Medication Sig Dispense Refill Last Dose    Cranberry 400 MG Oral Cap Take by mouth daily.   6/17/2024    vitamin E 200 UNITS Oral Cap Take 1 capsule (200 Units total) by mouth daily.   6/17/2024    TURMERIC OR Take by mouth.   6/17/2024    CALCIUM OR Take by mouth.   6/17/2024    Linolenic Acid (OMEGA 3 OR) Take by mouth.   6/17/2024    aspirin 81 MG Oral Tab EC Take 1 tablet (81 mg total) by mouth daily. 60 tablet 0 6/17/2024    omeprazole 20 MG Oral Capsule Delayed Release Take 1 capsule (20 mg total) by mouth daily. (Patient taking differently: Take 2 capsules (40 mg total) by mouth daily.) 90 capsule 3 6/25/2024 at 0945    conjugated estrogens 0.625 MG/GM Vaginal Cream Place 0.5 g vaginally twice a week. 2 x week 1 each 5 6/17/2024    chlordiazepoxide-clidinium 5-2.5 MG Oral Cap Take 1 capsule by mouth nightly.   6/24/2024    acetaminophen 500 MG Oral Tab Take 2 tablets (1,000 mg total) by mouth every 6  (six) hours as needed for Pain. 90 tablet 1 6/25/2024 at 0945    Cholecalciferol (VITAMIN D) 50 MCG (2000 UT) Oral Cap Take 1 capsule (2,000 Units total) by mouth daily.   6/17/2024    DAILY MULTIVITAMIN OR 1 TAB A DAY   6/17/2024       Allergies: No known allergies      Anesthesia Evaluation    Patient summary reviewed.    Anesthetic Complications  (-) history of anesthetic complications         GI/Hepatic/Renal      (+) GERD                      (+) irritable bowel syndrome     Cardiovascular    Negative cardiovascular ROS.    Exercise tolerance: good     MET: >4         (+) hyperlipidemia                                  Endo/Other    Negative endo/other ROS.                              Pulmonary    Negative pulmonary ROS.                       Neuro/Psych    Negative neuro/psych ROS.                                  Past Surgical History:   Procedure Laterality Date    Appendectomy      Colonoscopy N/A 1/17/2017    Procedure: COLONOSCOPY, POSSIBLE BIOPSY, POSSIBLE POLYPECTOMY 01582;  Surgeon: Juancarlos Suazo MD;  Location: [facility]    Hernia surgery Right     mesh, around yr 2000    Hip replacement surgery Right 04/28/2021    dr nance    Other surgical history      right shoulder surg.      Patient documented not to have experienced any of the following events  3/25/2013    Procedure: ESOPHAGOGASTRODUODENOSCOPY W/ DILATION;  Surgeon: Juancarlos Suazo MD;  Location: [facility]    Patient withough preoperative order for iv antibiotic surgical site infection prophylaxis.  3/25/2013    Procedure: ESOPHAGOGASTRODUODENOSCOPY W/ DILATION;  Surgeon: Juancarlos Suazo MD;  Location: [facility]    Repr vaginal prolapse,sacrosp lig      Total hip replacement      Total knee replacement Right 2010    @[facility]    Upper gi endoscopy,diagnosis  3/18/09    Performed by JUANCARLOS SUAZO at [facility]    Upper gi endoscopy,diagnosis  3/25/2013    Procedure:  ESOPHAGOGASTRODUODENOSCOPY, POSSIBLE BIOPSY, POSSIBLE POLYPECTOMY 00314;  Surgeon: Juancarlos Esteves MD;  [place] [facility] [place]     Social History     Socioeconomic History    Marital status:    Tobacco Use    Smoking status: Never    Smokeless tobacco: Never   Vaping Use    Vaping status: Never Used   Substance and Sexual Activity    Alcohol use: Yes     Alcohol/week: 2.0 standard drinks of alcohol     Types: 2 Standard drinks or equivalent per week    Drug use: No     History   Drug Use No     Available pre-op labs reviewed.               Airway      Mallampati: II  Mouth opening: >3 FB  TM distance: > 6 cm  Neck ROM: full Cardiovascular    Cardiovascular exam normal.  Rhythm: regular  Rate: normal     Dental    Dentition appears grossly intact         Pulmonary    Pulmonary exam normal.  Breath sounds clear to auscultation bilaterally.               Other findings              ASA: 2   Plan: general  NPO status verified and patient meets guidelines.  Patient has not taken beta blockers in last 24 hours.  Post-procedure pain management plan discussed with surgeon and patient.  Surgeon requests: regional block  Comment: I spoke with the patient and discussed the risks of general anesthesia, which include nausea and vomiting; sore throat; injury to the lips, gums, teeth, and eyes; cardiac, pulmonary, and neurologic events; aspiration; and allergic reactions. The patient understands these risks and consents to receiving general anesthesia for this procedure.  Plan/risks discussed with: patient and child/children                Present on Admission:  **None**

## 2024-06-25 NOTE — OPERATIVE REPORT
Operative Note    Arlene Martin Patient Status:  Inpatient    3/7/1940 MRN [id number]   [facility] SURGERY Attending Nidia Rivero MD   Hosp Day # 0 PCP Melina Nix MD         DATE OF SURGERY: 2024  PRIMARY SURGEON: Nidia Rivero MD  FIRST ASSISTANT (bedside): Ramo Daugherty    PREOPERATIVE DIAGNOSES:  Right UPJ obstruction, indwelling ureter stent   POSTOPERATIVE DIAGNOSES: Right UPJ obstruction, indwelling ureter stent     PROCEDURE PERFORMED:  Laparoscopic robotic-assisted right dismembered pyeloplasty, right antegrade ureter stent exchange.     COMPLICATIONS:  None.  ESTIMATED BLOOD LOSS:  5 mL.  PATHOLOGICAL SPECIMEN: None.   DRAINS: 16-Trinidadian Veras catheter; 6 Fr x 24 cm double J stent     Please note that Ramo Daugherty provided necessary first assistant services under my supervision throughout the robotic case.    INDICATIONS FOR PROCEDURE:  The patient was referred for evaluation of left UPJ obstruction.   Different treatment options were reviewed with the patient in great detail.  Patient elected to undergo a laparoscopic robotic-assisted  dismembered pyeloplasty after reviewing all risks, benefits, possible complications, and personel involved.  A consent was obtained.    PROCEDURE:  The patient was brought back to the operating room.  Patient was given intravenous antibiotics preoperatively.  Bilateral EPC cuffs were placed on the lower extremities.  General anesthesia was induced and the patient was then positioned in a left decubitus position with proper padding.  A 16 Fr Veras was inserted.  The patient was prepped and draped in the sterile fashion.      Through a periumbilical incision, a Veress needle was inserted into the peritoneum and pneumoperitoneum was obtained without complications.  An 8-mm port was then placed through the same incision and a camera was introduced.  No organ injury was observed.  The remaining ports were placed in standard fashion  under direct vision including an 8-mm trocar subcostally, an 8 mm trocar in left lower abdomen,  and another 8-mm just inferior to the first camera port. The robotic arms were then attached.    We mobilized the right colon and duodenum medially, exposing the inferior vena cava.  The right gonadal vein was visualized and carefully dissected bluntly and sharply until we were able to identify the right ureter.  The ureter was dissected toward the UPJ area, which was decompressed due to an indwelling stent, and there were no obvious crossing lower pole kidney vessels (to explain patient's UPJ obstruction and hydronephrosis previously observed on imaging).  Furthermore, space between renal collecting system and renal hilum was dissected which allowed further release of the renal pelvis (in order to allow for a tension-free-anastomosis).  Blunt dissection of ureter was carried distally as well to free the ureter as much as possible. Ultimately, the ureter was transected with sharp scissors at the UPJ level and spatulated wide open; similarly, the renal pelvic opening was spatulated.  The indwelling right ureter stent was removed and a new 6 Fr x 24 cm double J stent exchanged over a 0.035 guide wire in an antegrade manner.  The anastomosis between the ureter and renal pelvis was completed using interrupted 4-0 Vicryl sutures; the final evaluation appeared to have a water-tight anastomosis. There were no complications.     The skin incisions were approximated using 4-0 Monocryl subcuticular stitches.  There were no complications during the surgery.    I was present throughout the entirety of this procedure.      DISPOSITION:  The patient was transferred to PACU in stable condition and he was extubated without difficulty.  The patient will be admitted to hospital.    Nidia Rivero MD  [facility]  6/25/2024

## 2024-06-25 NOTE — H&P
[facility]  H&P Note    Arlene Martin Patient Status:  Inpatient    3/7/1940 MRN [id number]   Location [facility] PRE OP HOLDING Attending Nidia Rivero MD   Hosp Day # 0 PCP Melina Nix MD     Chief complaint:  Right hydronephrosis, UPJ obstruction   Recent right pyelonephritis    History of Present Illness:  Arlene Martin is an 84 year old female with right UPJ obstruction and hydronephrosis who presents for definitive pyeloplasty treatment today.     History:  Past Medical History:    Esophageal reflux    History of blood transfusion    possibly with knee surgery     IBS (irritable bowel syndrome)    Osteoarthritis     Past Surgical History:   Procedure Laterality Date    Appendectomy      Colonoscopy N/A 2017    Procedure: COLONOSCOPY, POSSIBLE BIOPSY, POSSIBLE POLYPECTOMY 62622;  Surgeon: Samina Esteves MD;  Location: [facility]    Hernia surgery Right     mesh, around yr     Hip replacement surgery Right 2021    dr nance    Other surgical history      right shoulder surg.      Patient documented not to have experienced any of the following events  3/25/2013    Procedure: ESOPHAGOGASTRODUODENOSCOPY W/ DILATION;  Surgeon: Samina Esteves MD;  Location: [facility]    Patient withough preoperative order for iv antibiotic surgical site infection prophylaxis.  3/25/2013    Procedure: ESOPHAGOGASTRODUODENOSCOPY W/ DILATION;  Surgeon: Samina Esteves MD;  Location: [facility]    Repr vaginal prolapse,sacrosp lig      Total hip replacement      Total knee replacement Right     @[facility]    Upper gi endoscopy,diagnosis  3/18/09    Performed by SAMINA ESTEVES at [facility]    Upper gi endoscopy,diagnosis  3/25/2013    Procedure: ESOPHAGOGASTRODUODENOSCOPY, POSSIBLE BIOPSY, POSSIBLE POLYPECTOMY 20636;  Surgeon: Samina Esteves MD;  Location: [facility]     Family History   Problem  Relation Age of Onset    Other ( at war) Father     Cancer Mother         Bladder      reports that she has never smoked. She has never used smokeless tobacco. She reports current alcohol use of about 2.0 standard drinks of alcohol per week. She reports that she does not use drugs.    Allergies:  Allergies   Allergen Reactions    No Known Allergies        Medications:    Current Facility-Administered Medications:     [Transfer Hold] acetaminophen (Tylenol Extra Strength) tab 1,000 mg, 1,000 mg, Oral, Once    lactated ringers infusion, , Intravenous, Continuous    ceFAZolin (Ancef) 2g in 10mL IV syringe premix, 2 g, Intravenous, Once    Review of Systems:  Pertinent items are noted in HPI.    Physical Exam:  /86 (BP Location: Left arm)   Pulse 51   Temp 97.4 °F (36.3 °C) (Oral)   Resp 16   Ht 5' (1.524 m)   Wt 129 lb 10.1 oz (58.8 kg)   SpO2 98%   BMI 25.32 kg/m²   GENERAL: well developed, well nourished, no apparent distress  EYES: sclera non-icteric, no redness   LUNGS: normal respiratory motion without distress  GI: Abdomen soft without organomegally, no tenderness  NEURO: Alert and Oriented, motor and sensory grossly non-focal   LYMPH:  No appreciable axillary, neck, or inguinal  Adenopathy  SKIN: No rashes, no discoloration  EXTREMITIES: No appreciable joint deformities     CT ab/pel 3/19/24  1.  Delayed right renal nephrogram with severe right hydronephrosis abruptly terminating near the level of the proximal ureter. Right perinephric stranding may be related to passive congestion or pyelonephritis. Further evaluation with retrograde ureterogram 2 evaluate for stricture is recommended.   2. No urolithiasis.   3.  Moderate to large hiatal hernia.      ASSESSMENT/PLAN:   Right hydronephrosis, UPJ obstruction   Recent right pyelonephritis  Risks of surgery, ie robotic assisted lap dismembered pyeloplasty include anesthesia risks, bleeding, injury to surrounding organs, recurrent scarring of  ureter, urine leak, infection, and need for more surgery     Nidia Rivero MD  [facility]  6/25/2024

## 2024-06-25 NOTE — ANESTHESIA POSTPROCEDURE EVALUATION
[facility]    Arlene Martin Patient Status:  Inpatient   Age/Gender 84 year old female MRN [id number]   Location [facility] POST ANESTHESIA CARE UNIT Attending Nidia Rivero MD   Hosp Day # 0 PCP Melina Nix MD       Anesthesia Post-op Note    XI ROBOTIC ASSISTED LAPAROSCOPIC RIGHT DISMEMBERED PYELOPLASTY, EXCHANGE OF RIGHT URETER STENT    Procedure Summary       Date: 06/25/24 Room / Location:  MAIN OR 08 /  MAIN OR    Anesthesia Start: 1430 Anesthesia Stop: 1642    Procedure: XI ROBOTIC ASSISTED LAPAROSCOPIC RIGHT DISMEMBERED PYELOPLASTY, EXCHANGE OF RIGHT URETER STENT (Right: Abdomen) Diagnosis: (OBSTRUCTION OF RIGHT URETEROPELVIC JUNCTION)    Surgeons: Nidia Rivero MD Anesthesiologist: Adam Meeks MD    Anesthesia Type: general ASA Status: 2            Anesthesia Type: general    Vitals Value Taken Time   /65 06/25/24 1645   Temp 97.8 °F (36.6 °C) 06/25/24 1645   Pulse 45 06/25/24 1645   Resp 11 06/25/24 1645   SpO2 93 % 06/25/24 1645   Vitals shown include unfiled device data.    Patient Location: PACU    Anesthesia Type: general    Airway Patency: patent and extubated    Postop Pain Control: adequate    Mental Status: mildly sedated but able to meaningfully participate in the post-anesthesia evaluation    Nausea/Vomiting: none    Cardiopulmonary/Hydration status: stable euvolemic    Complications: no apparent anesthesia related complications    Postop vital signs: stable    Dental Exam: Unchanged from Preop    Patient to be discharged from PACU when criteria met.

## 2024-06-25 NOTE — ANESTHESIA PROCEDURE NOTES
Regional Block    Date/Time: 6/25/2024 2:37 PM    Performed by: Adam Meeks MD  Authorized by: Adam Meeks MD      General Information and Staff    Start Time:  6/25/2024 2:37 PM  End Time:  6/25/2024 2:40 PM  Anesthesiologist:  Adam Meeks MD  Performed by:  Anesthesiologist  Patient Location:  OR    Block Placement: Post Induction  Site Identification: real time ultrasound guided and image stored and retrievable    Block site/laterality marked before start: site marked  Reason for Block: at surgeon's request and post-op pain management    Preanesthetic Checklist: 2 patient identifers, IV checked, site marked, risks and benefits discussed, monitors and equipment checked, pre-op evaluation, timeout performed, anesthesia consent, sterile technique used, no prohibitive neurological deficits and no local skin infection at insertion site      Procedure Details    Patient Position:  Supine  Prep: ChloraPrep    Monitoring:  Cardiac monitor, continuous pulse ox and blood pressure cuff  Block Type:  TAP  Laterality:  Right  Injection Technique:  Single-shot    Needle    Needle Type:  Short-bevel and echogenic  Needle Gauge:  21 G  Needle Length:  100 mm  Needle Localization:  Ultrasound guidance  Reason for Ultrasound Use: appropriate spread of the medication was noted in real time and no ultrasound evidence of intravascular and/or intraneural injection            Assessment    Injection Assessment:  Good spread noted, negative resistance, negative aspiration for heme, incremental injection, low pressure and no pain on injection  Paresthesia Pain:  None  Heart Rate Change: No    - Patient tolerated block procedure well without evidence of immediate block related complications.     Medications  6/25/2024 2:37 PM      Additional Comments    Medication:  Ropivacaine 0.25% 20mL

## 2024-06-25 NOTE — ANESTHESIA PROCEDURE NOTES
Airway  Date/Time: 6/25/2024 2:36 PM  Urgency: elective    Airway not difficult    General Information and Staff    Patient location during procedure: OR  Anesthesiologist: Adam Meeks MD  Performed: anesthesiologist   Performed by: Adam Meeks MD  Authorized by: Adam Meeks MD      Indications and Patient Condition  Indications for airway management: anesthesia  Sedation level: deep  Preoxygenated: yes  Patient position: sniffing  Mask difficulty assessment: 1 - vent by mask    Final Airway Details  Final airway type: endotracheal airway      Successful airway: ETT  Cuffed: yes   Successful intubation technique: direct laryngoscopy  Facilitating devices/methods: intubating stylet  Endotracheal tube insertion site: oral  Blade: Giacomo  Blade size: #3  ETT size (mm): 7.0    Cormack-Lehane Classification: grade IIA - partial view of glottis  Placement verified by: capnometry   Measured from: lips  ETT to lips (cm): 21  Number of attempts at approach: 1  Number of other approaches attempted: 0

## 2024-06-26 LAB
ANION GAP SERPL CALC-SCNC: 11 MMOL/L (ref 0–18)
BASOPHILS # BLD AUTO: 0.02 X10(3) UL (ref 0–0.2)
BASOPHILS NFR BLD AUTO: 0.1 %
BUN BLD-MCNC: 13 MG/DL (ref 9–23)
CALCIUM BLD-MCNC: 8.5 MG/DL (ref 8.5–10.1)
CHLORIDE SERPL-SCNC: 99 MMOL/L (ref 98–112)
CO2 SERPL-SCNC: 20 MMOL/L (ref 21–32)
CREAT BLD-MCNC: 0.86 MG/DL
EGFRCR SERPLBLD CKD-EPI 2021: 66 ML/MIN/1.73M2 (ref 60–?)
EOSINOPHIL # BLD AUTO: 0 X10(3) UL (ref 0–0.7)
EOSINOPHIL NFR BLD AUTO: 0 %
ERYTHROCYTE [DISTWIDTH] IN BLOOD BY AUTOMATED COUNT: 13.3 %
GLUCOSE BLD-MCNC: 200 MG/DL (ref 70–99)
HCT VFR BLD AUTO: 38 %
HGB BLD-MCNC: 12.5 G/DL
IMM GRANULOCYTES # BLD AUTO: 0.13 X10(3) UL (ref 0–1)
IMM GRANULOCYTES NFR BLD: 0.8 %
LYMPHOCYTES # BLD AUTO: 0.8 X10(3) UL (ref 1–4)
LYMPHOCYTES NFR BLD AUTO: 4.7 %
MCH RBC QN AUTO: 31.3 PG (ref 26–34)
MCHC RBC AUTO-ENTMCNC: 32.9 G/DL (ref 31–37)
MCV RBC AUTO: 95 FL
MONOCYTES # BLD AUTO: 0.87 X10(3) UL (ref 0.1–1)
MONOCYTES NFR BLD AUTO: 5.1 %
NEUTROPHILS # BLD AUTO: 15.32 X10 (3) UL (ref 1.5–7.7)
NEUTROPHILS # BLD AUTO: 15.32 X10(3) UL (ref 1.5–7.7)
NEUTROPHILS NFR BLD AUTO: 89.3 %
OSMOLALITY SERPL CALC.SUM OF ELEC: 276 MOSM/KG (ref 275–295)
PLATELET # BLD AUTO: 186 10(3)UL (ref 150–450)
POTASSIUM SERPL-SCNC: 4.2 MMOL/L (ref 3.5–5.1)
RBC # BLD AUTO: 4 X10(6)UL
SODIUM SERPL-SCNC: 130 MMOL/L (ref 136–145)
WBC # BLD AUTO: 17.1 X10(3) UL (ref 4–11)

## 2024-06-26 PROCEDURE — 87086 URINE CULTURE/COLONY COUNT: CPT | Performed by: PHYSICIAN ASSISTANT

## 2024-06-26 PROCEDURE — 85025 COMPLETE CBC W/AUTO DIFF WBC: CPT | Performed by: UROLOGY

## 2024-06-26 PROCEDURE — 80048 BASIC METABOLIC PNL TOTAL CA: CPT | Performed by: UROLOGY

## 2024-06-26 RX ORDER — TRAMADOL HYDROCHLORIDE 50 MG/1
50 TABLET ORAL EVERY 6 HOURS SCHEDULED
Qty: 12 TABLET | Refills: 0 | Status: SHIPPED | OUTPATIENT
Start: 2024-06-26

## 2024-06-26 RX ORDER — PSEUDOEPHEDRINE HCL 30 MG
100 TABLET ORAL 2 TIMES DAILY PRN
Qty: 30 CAPSULE | Refills: 0 | Status: SHIPPED | OUTPATIENT
Start: 2024-06-26

## 2024-06-26 RX ORDER — ASPIRIN 81 MG/1
81 TABLET ORAL DAILY
Status: SHIPPED | COMMUNITY
Start: 2024-07-03

## 2024-06-26 NOTE — PROGRESS NOTES
Patient to floor at 2030. Alert and oriented. VSS. 95% on 2L NC. General diet. Veras draining pink tinged urine, discontinue @ 0800. Mild pain controlled with scheduled tramadol, and tylenol. Stand by assist. PIV in R forearm, NS @ 50ml/hr. 5 laps with skin glue CDI. All questions and concerns addressed. Call light in reach.

## 2024-06-26 NOTE — CONSULTS
[facility] General Medicine Consult      Reason for consult:  Post op medical management     Consulted by:  Dr. Rivero      PCP: Melina Nix MD      History of Present Illness: Patient is a 84 year old female with PMH sig for GERD who presents s/p lap robotic assisted R dismembered pyleoplasty with R ureteral stent exchange.  She tolerated the procedure well without immediate complications.  No F/C, N/V.        PMH:  Past Medical History:    Esophageal reflux    History of blood transfusion    possibly with knee surgery 2010    IBS (irritable bowel syndrome)    Osteoarthritis        PSH:  Past Surgical History:   Procedure Laterality Date    Appendectomy      Colonoscopy N/A 1/17/2017    Procedure: COLONOSCOPY, POSSIBLE BIOPSY, POSSIBLE POLYPECTOMY 78856;  Surgeon: Juancarlos Esteves MD;  Location: [facility]    Hernia surgery Right     mesh, around yr 2000    Hip replacement surgery Right 04/28/2021    dr nance    Other surgical history      right shoulder surg.      Patient documented not to have experienced any of the following events  3/25/2013    Procedure: ESOPHAGOGASTRODUODENOSCOPY W/ DILATION;  Surgeon: Juancarlos Esteves MD;  Location: [facility]    Patient withough preoperative order for iv antibiotic surgical site infection prophylaxis.  3/25/2013    Procedure: ESOPHAGOGASTRODUODENOSCOPY W/ DILATION;  Surgeon: Juancarlos Esteves MD;  Location: [facility]    Repr vaginal prolapse,sacrosp lig      Total hip replacement      Total knee replacement Right 2010    @[facility]    Upper gi endoscopy,diagnosis  3/18/09    Performed by JUACNARLOS ESTEVES at [facility]    Upper gi endoscopy,diagnosis  3/25/2013    Procedure: ESOPHAGOGASTRODUODENOSCOPY, POSSIBLE BIOPSY, POSSIBLE POLYPECTOMY 25268;  Surgeon: Juancarlos Esteves MD;  Location: [facility]        Home Medications:  Outpatient Medications Marked as Taking for the 6/25/24  encounter (Hospital Encounter)   Medication Sig Dispense Refill    docusate sodium 100 MG Oral Cap Take 100 mg by mouth 2 (two) times daily as needed for constipation. 30 capsule 0    traMADol 50 MG Oral Tab Take 1 tablet (50 mg total) by mouth every 6 (six) hours. 12 tablet 0    [START ON 7/3/2024] aspirin 81 MG Oral Tab EC Take 1 tablet (81 mg total) by mouth daily. Resume in 1 week      Cranberry 400 MG Oral Cap Take by mouth daily.      vitamin E 200 UNITS Oral Cap Take 1 capsule (200 Units total) by mouth daily.      TURMERIC OR Take by mouth.      CALCIUM OR Take by mouth.      Linolenic Acid (OMEGA 3 OR) Take by mouth.      omeprazole 20 MG Oral Capsule Delayed Release Take 1 capsule (20 mg total) by mouth daily. (Patient taking differently: Take 2 capsules (40 mg total) by mouth daily.) 90 capsule 3    conjugated estrogens 0.625 MG/GM Vaginal Cream Place 0.5 g vaginally twice a week. 2 x week 1 each 5    chlordiazepoxide-clidinium 5-2.5 MG Oral Cap Take 1 capsule by mouth nightly.      acetaminophen 500 MG Oral Tab Take 2 tablets (1,000 mg total) by mouth every 6 (six) hours as needed for Pain. 90 tablet 1    Cholecalciferol (VITAMIN D) 50 MCG (2000 UT) Oral Cap Take 1 capsule (2,000 Units total) by mouth daily.      DAILY MULTIVITAMIN OR 1 TAB A DAY         Scheduled Medication:   chlordiazepoxide-clidinium  1 capsule Oral Nightly    pantoprazole  20 mg Oral QAM AC    enoxaparin  40 mg Subcutaneous Daily    sennosides  17.2 mg Oral Nightly    docusate sodium  100 mg Oral BID    acetaminophen  650 mg Oral Q4H While awake    traMADol  50 mg Oral 4 times per day     Continuous Infusing Medication:   sodium chloride 50 mL/hr at 06/25/24 1652     PRN Medication:  ondansetron    metoclopramide     ALL:  Allergies   Allergen Reactions    No Known Allergies         Soc Hx:  Social History     Tobacco Use    Smoking status: Never    Smokeless tobacco: Never   Substance Use Topics    Alcohol use: Yes      Alcohol/week: 2.0 standard drinks of alcohol     Types: 2 Standard drinks or equivalent per week        Fam Hx  Family History   Problem Relation Age of Onset    Other ( at war) Father     Cancer Mother         Bladder       Review of Systems  Comprehensive ROS reviewed and negative except for what's stated above.  Including negative for chest pain, shortness of breath, syncope.       OBJECTIVE:  /59 (BP Location: Left arm)   Pulse 67   Temp 98.8 °F (37.1 °C) (Oral)   Resp 18   Ht 5' (1.524 m)   Wt 129 lb 10.1 oz (58.8 kg)   SpO2 96%   BMI 25.32 kg/m²   Gen: No acute distress, alert and oriented x3, no focal neurologic deficits  HEENT:  EOMI, PERRLA, OP clear, MMM  Pulm: Lungs clear bilaterally, normal respiratory effort  CV: Heart with regular rate and rhythm, no murmur.  Normal PMI.    Abd: Abdomen soft, nontender, nondistended, no organomegaly, bowel sounds present  MSK: Full range of motion in extremities, no clubbing, no cyanosis  Skin: no rashes or lesions  Neuro:  Grossly intact, no sensory deficits   :  moore in place with gross hematuria;     Diagnostics:   CBC/Chem  Recent Labs   Lab 24  0541   WBC 17.1*   HGB 12.5   MCV 95.0   .0       Recent Labs   Lab 24  0541   *   K 4.2   CL 99   CO2 20.0*   BUN 13   CREATSERUM 0.86   *   CA 8.5       No results for input(s): \"ALT\", \"AST\", \"ALB\", \"AMYLASE\", \"LIPASE\", \"LDH\" in the last 168 hours.    Invalid input(s): \"ALPHOS\", \"TBIL\", \"DBIL\", \"TPROT\"    Radiology: No results found.       ASSESSMENT / PLAN:    84 yr old female with PMH sig for GERD who presents s/p lap robotic assisted R dismembered pyleoplasty with R ureteral stent exchange.    # R UPJ obstruction   s/p lap robotic assisted R dismembered pyleoplasty with R ureteral stent exchange.  Post op care per     # Leukocytosis   - suspect reactive   - repeat CBC in AM    # GERD  Cont PPI      Outpatient records reviewed confirming patient's medical history and  medications.     Further recommendations pending patient's clinical course.  [facility] hospitalist to continue to follow patient while in house    Patient and/or patient's family given opportunity to ask questions and note understanding and agreeing with therapeutic plan as outlined      Thank you for allowing me to participate in the care of this patient.     Thank You,    Antonio Sprague DO  [facility] Hospitalist  Answering Service number: [phone]

## 2024-06-26 NOTE — PLAN OF CARE
Pt A&Ox4, resting in bed.  Resp: RA  GI/: Veras  Activity/Safety: up w/asst  Skin: abd lap sites x5 w/glue, c/d/i  Pain: Tylenol given  Pt updated on and agreeable to POC; IV fluids, Veras care, urine cx for elevated WBC.

## 2024-06-26 NOTE — PROGRESS NOTES
[facility]  Urology Progress Note    Arlene Martin Patient Status:  Inpatient    3/7/1940 MRN [id number]   [facility] 3NW-A Attending Nidia Rivero MD   Hosp Day # 1 PCP Melina Nix MD     Subjective:  Arlene Martin is a(n) 84 year old female.    S/P laparoscopic robotic-assisted right dismembered pyeloplasty, right antegrade ureter stent exchange 24 with Dr. Rivero    Current complaints: Pain controlled.  No nausea, vomiting, fever, or chills.  Tolerating clears.  No flatus. No CP, SOB, or calf pain.      Objective:  General appearance: alert, appears stated age, and cooperative  Blood pressure 104/61, pulse 62, temperature 98.2 °F (36.8 °C), temperature source Oral, resp. rate 16, height 5' (1.524 m), weight 129 lb 10.1 oz (58.8 kg), SpO2 95%.  Lungs: non-labored respirations  Abdomen: soft, mildly distended with expected incisional tenderness.  Incisions C/D/I.    : moore catheter in place draining reddish urine (UOP - 1775 mL total yesterday).      Lab Results   Component Value Date    WBC 17.1 2024    HGB 12.5 2024    HCT 38.0 2024    .0 2024    CREATSERUM 0.86 2024    BUN 13 2024     2024    K 4.2 2024    CL 99 2024    CO2 20.0 2024     2024    CA 8.5 2024        Assessment:    RIGHT UPJ OBSTRUCTION, INDWELLING URETER STENT   S/P laparoscopic robotic-assisted right dismembered pyeloplasty, right antegrade ureter stent exchange 24 with Dr. Rivero  Afebrile  WBC 17.1  Hgb 12.5  Serum creat 0.86    Plan:    Encouraged ambulation and use of IS x 10/hr  Check urine culture  Continue with moore catheter today  Advance diet as tolerated  Pain management  Bowel regimen  Aspirin and OTC supplements may be resumed in 1 week per Dr. Rivero.  Hospitalist consulted    Patient to follow-up on 24 for cystoscopy/stent removal    Not ready for discharge today    Above discussed  with patient, nurse, Dr. Rivero.      Jody Parmar PA-C  [facility]  Department of Urology  6/26/2024  6:15 AM

## 2024-06-26 NOTE — CM/SW NOTE
24 1600   CM/SW Referral Data   Referral Source Social Work (self-referral)   Reason for Referral Discharge planning   Informant Patient   Medical Hx   Does patient have an established PCP? Yes   Patient Info   Patient's Current Mental Status at Time of Assessment Alert;Oriented   Patient's Home Environment House   Patient lives with Alone   Patient Status Prior to Admission   Independent with ADLs and Mobility Yes   Services in place prior to admission DME/Supplies at home   Type of DME/Supplies Stair Lift   Discharge Needs   Anticipated D/C needs No anticipated discharge needs     SW met with pt to complete assessment, and discuss DC planning needs. Pt is an 85 y/o female admitted for s/p Laparoscopic robotic-assisted right dismembered pyeloplasty, right antegrade ureter stent exchange, she was alert, and oriented at time of discussion.    Pt reports she lives home alone, reports her son, and daughter in law live down the block. Reports strong support from family. Pt reports typically independent with ADLs. Does not have any DME for herself. However reports she has stair lift installed, because her now  sps, had hx of Parkinson's.    Pt denies any needs for DC.     SW/CM to remain available for dc planning, and/or additional need for support.    Junior MERINO, ALINE  Discharge Planner  [id number]

## 2024-06-26 NOTE — DISCHARGE INSTRUCTIONS
[facility] GENERAL UROLOGY POST OPERATIVE INSTRUCTIONS    The time after surgery is one that can be interesting, scary, and full of questions.  Here are some general items to help you navigate the post-operative period.  At any time, should you have any questions, don't hesitate to contact our office for additional assistance.    DIET:  Unless otherwise directed, resume your regular healthy diet.  Return to any medically-directed diets if necessary (renal diet, diabetic diet, etc.).  Drink plenty of fluids.  Most fluids are all right, including small amounts of alcoholic beverages if desired (but not recommended if you are taking prescription pain medication or other medications that you may not use with alcohol ingestion.)    ACTIVITY:  Avoid strenuous physical exercise, including heavy lifting/pushing/pulling (>20 lbs.) and sexual intercourse until released by your doctor.  This is generally a period of four to eight weeks for open or laparoscopic surgical procedures, or 1-2 weeks for outpatient procedures.  Driving is generally okay once pain free and off all prescription pain medications; of course, you may be a passenger for short rides.  Going out to the library, to supper and a movie, or other light activity is even encouraged if you feel well.  Extended travel is not recommended until you are released by your surgeon.  It is okay to go up and down stairs as long as you go slowly.  If you have any surgical clips or sutures, you may be allowed to take routine showers, but should not submerge your incisions in standing water (pool, tub, etc.) for 21 days.  Avoid rubbing or scrubbing the incision(s).  Rather, allow soapy water to pass over the incisions and simply pat them dry.    VOIDING:  Many urology patients will be discharged with a catheter and will need additional instructions (see below); however, for patients without a catheter, please void whenever the urge presents itself.  Do not hold your  urine.  You may be getting up in the middle of the night or urinating more frequently during the daytime after any urologic procedure.  This is normal after many types of surgeries, but a more normal urinary voiding pattern should resume within days, or rarely within a few weeks.  If you are unable to urinate altogether, please phone our nurse for further instructions, or seek attention in an immediate/urgent/or emergent setting.      BLOOD IN THE URINE:  You may have some blood in the urine for two to four weeks after some urologic procedures.  This is usually not serious and a normal part of healing from some urinary surgeries.  Should you have persistent blood, large clots, or the inability to void due to large clots, notify you urology team.    REST:  You should get plenty of rest after any procedure.  However, use your bed as you did prior to your surgery - to take a small nap, and to sleep in the evenings.  Do not lie around in bed all day as this can actually result in post anesthesia complications.  We encourage you to get out of bed daily, take multiple light walks, strolling outdoors if desired.  It is well known that patients that lie or sit all day have more wound complications, at times sever complications from blood clots in the legs, pneumonias, and other challenges.    CONSTIPATION:  Please work to avoid constipation.  We do not recommend enemas or most rectal suppositories after most urologic surgery.  Daily use of Colace or Docusate over-the-counter (100 mg three times daily with 8 oz water) is recommended for the month after surgery - especially if taking prescription pain medication.  Daily prune juice and increased intake of fruits and vegetables are also helpful to prevent acute constipation.  Acute constipation may necessitate the use of over-the-counter Milk of Magnesia - 30 cc every evening until effect - if needed.    MEDICATIONS:  Unless otherwise directed, resume all of your prior home  medications upon discharge.  The exception may be blood thinners (Coumadin, Warfarin, Plavix, Xeralto, etc.) which are typically held for one week prior to, and after, larger urologic surgeries.  Your surgeon will give the recommended times for these medications to be held so that you may work with the nurse or physician tem that manages your anticoagulation medication.  Should you be prescribed and antibiotic please take as directed until completed.    STENTS AND CATHETERS:  You may be discharged with a ureteral stent (hollow tube, placed in the conduit from your kidney to your bladder to allow unobstructed urine passage), or urethral catheter (tube placed into your urethra - into your bladder - to allow free passage of urine to a collection device.  Each are vital to the healing of your normal urinary tract after some surgeries, lasting as short as a few days, to [rarely] months.    With stents you may have some normal routine discomfort (it is a foreign body in your urinary tract), that can be similar to that of a kidney stone.  At times, pressure from your bladder when you urinate can be transmitted up to the stent and cause pain.  You will get used to this with time and many patients have found that if they urinate hourly (regardless of sensation to urinate) they will have more relief.  It is also normal to have frequent urination with the stent, or a feeling of bladder fullness or spasms and at times difficulty with emptying your bladder.  These symptoms also tend to get better with time, especially with plenty of fluids and use of your medications as directed.  As previously noted, you may notice blood in the urine - especially with more activity.  Until cleared by your surgeon to do so, avoid rigorous activities with either a stent or catheter to prevent worsening discomfort or blood in the urine.    It is important that your stent be removed by a urologist - this is NOT a permanent medical device.  It is  also important that all stent and urinary catheter manipulations be coordinated with your urinary team.  Removing a stent is rather simple, usually done under local anesthesia a with a small cystoscope in the office.  Some stents have a small string, which exits the urethra and may be visible in your pelvic area.  DO NOT pull this string, as it will cause the stent to dislodge and may require surgery to replace.    INCENTIVE SPIROMETER/DEEP BREATHING EXERCISES:  You may be provided with an incentive spirometer (IS) breathing exercise machine while in the hospital.  Your nursing staff will educate you on it's use.  This is a very important piece to post anesthesia pneumonia prevention, so take your IS home with you and continue regular use (10x/hour while awake) for the entire time you recover at home prior rot returning to work or regular activities.    FOLLOW UP CARE:  Please call our office at [phone] to coordinate follow up     Follow-up with Dr. Rivero on 7/24/24 for cystoscopy/stent removal    Aspirin may be resumed in 1 week    Over-the-counter supplements may be resumed in 1 week    NOTIFY OUR OFFICE OF:  - Temperature greater than 101 degrees.  - Accitentally pulling the string and your stent is pulled out.  - Any questions you think are important for your urology team.  - You have challenges with catheter management.    SEEK EMERGENCY CARE with sudden onset of shortness of breath, chest pain, increasing calf or leg pain, or acute condition that you feel needs emergent attention.

## 2024-06-27 ENCOUNTER — APPOINTMENT (OUTPATIENT)
Dept: GENERAL RADIOLOGY | Facility: HOSPITAL | Age: 84
DRG: 661 | End: 2024-06-27
Attending: PHYSICIAN ASSISTANT
Payer: MEDICARE

## 2024-06-27 VITALS
WEIGHT: 129.63 LBS | DIASTOLIC BLOOD PRESSURE: 77 MMHG | HEIGHT: 60 IN | SYSTOLIC BLOOD PRESSURE: 138 MMHG | TEMPERATURE: 98 F | HEART RATE: 66 BPM | RESPIRATION RATE: 18 BRPM | BODY MASS INDEX: 25.45 KG/M2 | OXYGEN SATURATION: 95 %

## 2024-06-27 LAB
ANION GAP SERPL CALC-SCNC: 5 MMOL/L (ref 0–18)
BASOPHILS # BLD AUTO: 0.03 X10(3) UL (ref 0–0.2)
BASOPHILS NFR BLD AUTO: 0.3 %
BUN BLD-MCNC: 10 MG/DL (ref 9–23)
CALCIUM BLD-MCNC: 8.4 MG/DL (ref 8.5–10.1)
CHLORIDE SERPL-SCNC: 103 MMOL/L (ref 98–112)
CO2 SERPL-SCNC: 26 MMOL/L (ref 21–32)
CREAT BLD-MCNC: 0.74 MG/DL
EGFRCR SERPLBLD CKD-EPI 2021: 79 ML/MIN/1.73M2 (ref 60–?)
EOSINOPHIL # BLD AUTO: 0.02 X10(3) UL (ref 0–0.7)
EOSINOPHIL NFR BLD AUTO: 0.2 %
ERYTHROCYTE [DISTWIDTH] IN BLOOD BY AUTOMATED COUNT: 13.5 %
GLUCOSE BLD-MCNC: 123 MG/DL (ref 70–99)
HCT VFR BLD AUTO: 36.4 %
HGB BLD-MCNC: 12.2 G/DL
IMM GRANULOCYTES # BLD AUTO: 0.04 X10(3) UL (ref 0–1)
IMM GRANULOCYTES NFR BLD: 0.4 %
LYMPHOCYTES # BLD AUTO: 2.35 X10(3) UL (ref 1–4)
LYMPHOCYTES NFR BLD AUTO: 23.6 %
MCH RBC QN AUTO: 31.4 PG (ref 26–34)
MCHC RBC AUTO-ENTMCNC: 33.5 G/DL (ref 31–37)
MCV RBC AUTO: 93.6 FL
MONOCYTES # BLD AUTO: 0.81 X10(3) UL (ref 0.1–1)
MONOCYTES NFR BLD AUTO: 8.1 %
NEUTROPHILS # BLD AUTO: 6.71 X10 (3) UL (ref 1.5–7.7)
NEUTROPHILS # BLD AUTO: 6.71 X10(3) UL (ref 1.5–7.7)
NEUTROPHILS NFR BLD AUTO: 67.4 %
OSMOLALITY SERPL CALC.SUM OF ELEC: 278 MOSM/KG (ref 275–295)
PLATELET # BLD AUTO: 178 10(3)UL (ref 150–450)
POTASSIUM SERPL-SCNC: 4 MMOL/L (ref 3.5–5.1)
RBC # BLD AUTO: 3.89 X10(6)UL
SODIUM SERPL-SCNC: 134 MMOL/L (ref 136–145)
WBC # BLD AUTO: 10 X10(3) UL (ref 4–11)

## 2024-06-27 PROCEDURE — 74018 RADEX ABDOMEN 1 VIEW: CPT | Performed by: PHYSICIAN ASSISTANT

## 2024-06-27 PROCEDURE — 85025 COMPLETE CBC W/AUTO DIFF WBC: CPT | Performed by: UROLOGY

## 2024-06-27 PROCEDURE — 80048 BASIC METABOLIC PNL TOTAL CA: CPT | Performed by: UROLOGY

## 2024-06-27 RX ORDER — OMEPRAZOLE 20 MG/1
40 CAPSULE, DELAYED RELEASE ORAL DAILY
Status: SHIPPED | COMMUNITY
Start: 2024-06-27

## 2024-06-27 RX ORDER — METOCLOPRAMIDE HYDROCHLORIDE 5 MG/ML
5 INJECTION INTRAMUSCULAR; INTRAVENOUS EVERY 8 HOURS PRN
Status: DISCONTINUED | OUTPATIENT
Start: 2024-06-27 | End: 2024-06-27

## 2024-06-27 NOTE — PROGRESS NOTES
[facility]  Urology Progress Note    Arlene Martin Patient Status:  Inpatient    3/7/1940 MRN [id number]   [facility] 3NW-A Attending Nidia Rivero MD   Hosp Day # 2 PCP Melina Nix MD     Subjective:  Arlene Martin is a(n) 84 year old female.    S/P laparoscopic robotic-assisted right dismembered pyeloplasty, right antegrade ureter stent exchange 24 with Dr. Rivero     Current complaints: +abdominal pain/some bloating - pain medication helping.  No nausea, vomiting, fever, or chills. Tolerating diet.  +flatus.  No CP, SOB. Ambulated once yesterday.      Objective:  General appearance: alert, appears stated age, and cooperative  Blood pressure 118/65, pulse 64, temperature 98.3 °F (36.8 °C), temperature source Oral, resp. rate 18, height 5' (1.524 m), weight 129 lb 10.1 oz (58.8 kg), SpO2 93%.  Lungs: non-labored respirations  Abdomen: soft, mildly distended with expected incisional tenderness.  Incisions C/D/I.    : moore catheter in place draining blood tinged urine, transparent - improved from yesterday (UOP - 2775 mL/24 hours)  Lab Results   Component Value Date    WBC 17.1 2024    HGB 12.5 2024    HCT 38.0 2024    .0 2024    CREATSERUM 0.86 2024    BUN 13 2024     2024    K 4.2 2024    CL 99 2024    CO2 20.0 2024     2024    CA 8.5 2024        Assessment:    RIGHT UPJ OBSTRUCTION, INDWELLING URETER STENT   S/P laparoscopic robotic-assisted right dismembered pyeloplasty, right antegrade ureter stent exchange 24 with Dr. Rivero  Afebrile  WBC 17.1  Hgb 12.5  Serum creat 0.86  Repeat labs to be collected  Urine culture pending    Plan:    Encouraged ambulation (at least TID), OOB, use of IS x 10/hr  Pain management  Bowel regimen  Will determine disposition of moore catheter after discussion with Dr. Rivero.  Aspirin and OTC supplements may be resumed in 1 week per   Mat.   Hospitalist following    Possible DC today pending clinical course and approval from hospitalist    Above discussed with patient, nurse  Will update Dr. Rivero.      Jody Parmar PA-C  [facility]  Department of Urology  6/27/2024    Addendum:  Recent Labs   Lab 06/26/24  0541 06/27/24  0544   RBC 4.00 3.89   HGB 12.5 12.2   HCT 38.0 36.4   MCV 95.0 93.6   MCH 31.3 31.4   MCHC 32.9 33.5   RDW 13.3 13.5   NEPRELIM 15.32* 6.71   WBC 17.1* 10.0   .0 178.0       Recent Labs   Lab 06/26/24  0541 06/27/24  0544   * 123*   BUN 13 10   CREATSERUM 0.86 0.74   EGFRCR 66 79   CA 8.5 8.4*   * 134*   K 4.2 4.0   CL 99 103   CO2 20.0* 26.0     Leukocytosis resolved  Hgb 12.5 > 12.2  Serum creat 0.86 > 0.74    Above reviewed with Dr. Rivero.  Check KUB to assess position of stent then will determine if able to remove moore catheter for voiding trial.      Above discussed with patient, nurse, Dr. Rivero.    ALFREDO Lara  Urology    Addendum:  Dr.. Rivero reviewed KUB - stent looks ok. Moore catheter may be removed.    Above discussed with nurse.    ALFREDO Lara  Urology

## 2024-06-27 NOTE — PLAN OF CARE
Patient is alert and oriented. ON room air. Abdomen is soft/non-distended. Patient states passing gas. Veras catheter intact draining bloody urine. Lap sites x4 closed with skin glue-c/d/I/kate. Patient ambulated halls this morning. Call light within reach.

## 2024-06-27 NOTE — PROGRESS NOTES
A&Ox4. VSS. RA. .  GI: Abdomen soft, nondistended. Passing gas.  Denies nausea.  : moore draining red tinged urine.   Pain controlled with PRN pain medications  Up with standby assist.  Incisions: 5 laps with skin glue  Diet: general  IVF running per order.  All appropriate safety measures in place. All questions and concerns addressed.

## 2024-06-27 NOTE — PROGRESS NOTES
NURSING DISCHARGE NOTE    Discharged Home via Wheelchair.  Accompanied by Friend  Belongings Taken by patient/family.    IV taken out. Discharge instructions given to patient. Patient verbalized understanding. Prescriptions sent to patient's pharmacy.

## 2024-06-27 NOTE — PROGRESS NOTES
[facility]   part of [facility] Hospitalist Progress Note     Arlene Martin Patient Status:  Inpatient    3/7/1940 MRN [id number]   Location [facility] 3NW-A Attending Nidia Rivero MD   Hosp Day # 2 PCP Melina Nix MD     Follow Up:  The primary encounter diagnosis was Pre-op testing. A diagnosis of Obstruction of left ureteropelvic junction (UPJ) was also pertinent to this visit.    Subjective:     Patient seen and examined.  Doing well, states pain controlled.  No F/C, N/V.     Objective:    Review of Systems:   10 point ROS completed and was negative, except for pertinent positive and negatives stated in subjective.    Vital signs:  Temp:  [97.6 °F (36.4 °C)-98.8 °F (37.1 °C)] 98.3 °F (36.8 °C)  Pulse:  [58-75] 69  Resp:  [18] 18  BP: (109-144)/(58-83) 125/58  SpO2:  [93 %-98 %] 98 %    Physical Exam:    Gen: No acute distress, alert and oriented x3, no focal neurologic deficits  HEENT:  EOMI, PERRLA, OP clear, MMM  Pulm: Lungs clear bilaterally, normal respiratory effort  CV: Heart with regular rate and rhythm, no murmur.  Normal PMI.    Abd: Abdomen soft, nontender, nondistended, no organomegaly, bowel sounds present  MSK: Full range of motion in extremities, no clubbing, no cyanosis  Skin: no rashes or lesions  Neuro:  Grossly intact, no sensory deficits   :  moore in place with gross hematuria;       Diagnostic Data:    Labs:  Recent Labs   Lab 24  0541 24  0544   WBC 17.1* 10.0   HGB 12.5 12.2   MCV 95.0 93.6   .0 178.0       Recent Labs   Lab 24  0541 24  0544   * 123*   BUN 13 10   CREATSERUM 0.86 0.74   CA 8.5 8.4*   * 134*   K 4.2 4.0   CL 99 103   CO2 20.0* 26.0       Estimated Creatinine Clearance: 39.9 mL/min (based on SCr of 0.74 mg/dL).    No results for input(s): \"PTP\", \"INR\" in the last 168 hours.         COVID-19 Lab Results    COVID-19  Lab Results   Component Value Date    COVID19 Not Detected  04/25/2021       Pro-Calcitonin  No results for input(s): \"PCT\" in the last 168 hours.    Cardiac  No results for input(s): \"TROP\", \"PBNP\" in the last 168 hours.    Creatinine Kinase  No results for input(s): \"CK\" in the last 168 hours.    Inflammatory Markers  No results for input(s): \"CRP\", \"JORDAN\", \"LDH\", \"DDIMER\" in the last 168 hours.    Imaging: Imaging data reviewed in Epic.    Medications:    chlordiazepoxide-clidinium  1 capsule Oral Nightly    pantoprazole  20 mg Oral QAM AC    enoxaparin  40 mg Subcutaneous Daily    sennosides  17.2 mg Oral Nightly    docusate sodium  100 mg Oral BID    acetaminophen  650 mg Oral Q4H While awake    traMADol  50 mg Oral 4 times per day       Assessment & Plan:      84 yr old female with PMH sig for GERD who presents s/p lap robotic assisted R dismembered pyleoplasty with R ureteral stent exchange.     # R UPJ obstruction   s/p lap robotic assisted R dismembered pyleoplasty with R ureteral stent exchange.  Post op care per      # Leukocytosis   - suspect reactive   - now resolved      # GERD  Cont PPI    Plan of care: inpt care     Plan of care discussed with patient or family at bedside.    Antonio Sprague DO    Supplementary Documentation:     Quality:  DVT Prophylaxis: SCD  CODE status: FULL  Veras: yes  Central line: no  If COVID testing is negative, may discontinue isolation: yes     Estimated date of discharge: later today vs tomorrow   Discharge is dependent on:  clearance   At this point Ms. Martin is expected to be discharge to: home    Plan of care discussed with pt
